# Patient Record
Sex: MALE | Race: WHITE | NOT HISPANIC OR LATINO | Employment: OTHER | URBAN - NONMETROPOLITAN AREA
[De-identification: names, ages, dates, MRNs, and addresses within clinical notes are randomized per-mention and may not be internally consistent; named-entity substitution may affect disease eponyms.]

---

## 2023-01-23 PROBLEM — M54.9 CHRONIC BACK PAIN: Status: ACTIVE | Noted: 2023-01-23

## 2023-01-23 PROBLEM — G89.29 CHRONIC BACK PAIN: Status: ACTIVE | Noted: 2023-01-23

## 2023-01-23 PROBLEM — E78.5 HYPERLIPIDEMIA: Status: ACTIVE | Noted: 2023-01-23

## 2023-01-23 PROBLEM — M47.817 LUMBOSACRAL SPONDYLOSIS: Status: ACTIVE | Noted: 2023-01-23

## 2023-01-23 PROBLEM — M48.062 NEUROGENIC CLAUDICATION DUE TO LUMBAR SPINAL STENOSIS: Status: ACTIVE | Noted: 2023-01-23

## 2023-01-23 PROBLEM — I10 HTN (HYPERTENSION): Status: ACTIVE | Noted: 2023-01-23

## 2023-01-23 PROBLEM — M51.26 PROLAPSED LUMBAR DISC: Status: ACTIVE | Noted: 2023-01-23

## 2023-01-23 PROBLEM — R20.0 NUMBNESS: Status: ACTIVE | Noted: 2023-01-23

## 2023-01-23 PROBLEM — E11.9 DM II (DIABETES MELLITUS, TYPE II), CONTROLLED (MULTI): Status: ACTIVE | Noted: 2023-01-23

## 2023-01-23 RX ORDER — METOPROLOL SUCCINATE 25 MG/1
1 TABLET, EXTENDED RELEASE ORAL DAILY
COMMUNITY
Start: 2022-02-02 | End: 2023-06-28

## 2023-01-23 RX ORDER — ASPIRIN 81 MG/1
1 TABLET ORAL DAILY
COMMUNITY
Start: 2022-04-12

## 2023-01-23 RX ORDER — SIMVASTATIN 40 MG/1
1 TABLET, FILM COATED ORAL NIGHTLY
COMMUNITY
Start: 2022-04-12 | End: 2023-03-14 | Stop reason: ALTCHOICE

## 2023-01-23 RX ORDER — GABAPENTIN 100 MG/1
1 CAPSULE ORAL
COMMUNITY
Start: 2022-08-24 | End: 2023-03-14 | Stop reason: ALTCHOICE

## 2023-01-23 RX ORDER — METFORMIN HYDROCHLORIDE 1000 MG/1
1 TABLET ORAL
COMMUNITY
Start: 2022-04-12 | End: 2023-10-12 | Stop reason: SDUPTHER

## 2023-01-23 RX ORDER — MULTIVITAMIN
1 TABLET ORAL DAILY
COMMUNITY
Start: 2022-04-12

## 2023-03-07 ENCOUNTER — APPOINTMENT (OUTPATIENT)
Dept: PRIMARY CARE | Facility: CLINIC | Age: 83
End: 2023-03-07
Payer: MEDICARE

## 2023-03-14 ENCOUNTER — OFFICE VISIT (OUTPATIENT)
Dept: PRIMARY CARE | Facility: CLINIC | Age: 83
End: 2023-03-14
Payer: MEDICARE

## 2023-03-14 VITALS
SYSTOLIC BLOOD PRESSURE: 165 MMHG | BODY MASS INDEX: 41.58 KG/M2 | DIASTOLIC BLOOD PRESSURE: 86 MMHG | HEIGHT: 71 IN | WEIGHT: 297 LBS | HEART RATE: 94 BPM

## 2023-03-14 DIAGNOSIS — M48.062 NEUROGENIC CLAUDICATION DUE TO LUMBAR SPINAL STENOSIS: ICD-10-CM

## 2023-03-14 DIAGNOSIS — E78.2 MIXED HYPERLIPIDEMIA: ICD-10-CM

## 2023-03-14 DIAGNOSIS — E11.9 CONTROLLED TYPE 2 DIABETES MELLITUS WITHOUT COMPLICATION, WITHOUT LONG-TERM CURRENT USE OF INSULIN (MULTI): ICD-10-CM

## 2023-03-14 DIAGNOSIS — M47.27 OSTEOARTHRITIS OF SPINE WITH RADICULOPATHY, LUMBOSACRAL REGION: ICD-10-CM

## 2023-03-14 DIAGNOSIS — I10 PRIMARY HYPERTENSION: Primary | ICD-10-CM

## 2023-03-14 PROCEDURE — 1159F MED LIST DOCD IN RCRD: CPT | Performed by: INTERNAL MEDICINE

## 2023-03-14 PROCEDURE — 3077F SYST BP >= 140 MM HG: CPT | Performed by: INTERNAL MEDICINE

## 2023-03-14 PROCEDURE — 1036F TOBACCO NON-USER: CPT | Performed by: INTERNAL MEDICINE

## 2023-03-14 PROCEDURE — 99214 OFFICE O/P EST MOD 30 MIN: CPT | Performed by: INTERNAL MEDICINE

## 2023-03-14 PROCEDURE — 1160F RVW MEDS BY RX/DR IN RCRD: CPT | Performed by: INTERNAL MEDICINE

## 2023-03-14 PROCEDURE — 3079F DIAST BP 80-89 MM HG: CPT | Performed by: INTERNAL MEDICINE

## 2023-03-14 RX ORDER — GLIPIZIDE 5 MG/1
1 TABLET, FILM COATED, EXTENDED RELEASE ORAL DAILY
COMMUNITY
Start: 2023-03-09

## 2023-03-14 RX ORDER — INSULIN GLARGINE 100 [IU]/ML
15 INJECTION, SOLUTION SUBCUTANEOUS EVERY MORNING
COMMUNITY
End: 2024-04-16 | Stop reason: ALTCHOICE

## 2023-03-14 RX ORDER — SIMVASTATIN 40 MG/1
40 TABLET, FILM COATED ORAL NIGHTLY
Qty: 90 TABLET | Refills: 1 | Status: SHIPPED | OUTPATIENT
Start: 2023-03-14 | End: 2023-10-24 | Stop reason: SDUPTHER

## 2023-03-14 ASSESSMENT — ENCOUNTER SYMPTOMS
SHORTNESS OF BREATH: 0
DIARRHEA: 0
NAUSEA: 0
ABDOMINAL DISTENTION: 0
BACK PAIN: 1
WHEEZING: 0
WEAKNESS: 0
NUMBNESS: 0
FATIGUE: 0
ACTIVITY CHANGE: 0
APPETITE CHANGE: 0
VOMITING: 0
SINUS PRESSURE: 0
CHILLS: 0
COUGH: 0
SORE THROAT: 0
SINUS PAIN: 0

## 2023-03-14 ASSESSMENT — PATIENT HEALTH QUESTIONNAIRE - PHQ9
1. LITTLE INTEREST OR PLEASURE IN DOING THINGS: NOT AT ALL
2. FEELING DOWN, DEPRESSED OR HOPELESS: NOT AT ALL
SUM OF ALL RESPONSES TO PHQ9 QUESTIONS 1 AND 2: 0

## 2023-03-14 NOTE — ASSESSMENT & PLAN NOTE
- following with pain management   - had lumbar mri   - did not complete pt because then he got bell's palsy

## 2023-03-14 NOTE — ASSESSMENT & PLAN NOTE
- seeing Endocrinology now, will have labs in 3 mo   - continue glipizide 5mg po daily   - continue lantus 20 units   - continue metformin 1000mg po bid

## 2023-03-14 NOTE — PROGRESS NOTES
"Subjective   Patient ID: John Hicks is a 82 y.o. male who presents for Follow-up (3 month/BS readings: 129; 131; 138; 141; 107; 146; 135).  HPI    Patient is here today for 3 mo follow up on DMII   Pt had URI recently but that has resolved, wife is sick now.   Pt reports that his wife has been limited his sweets, overall his blood sugars are much improved.   [place] palsy resolved.     Review of Systems   Constitutional:  Negative for activity change, appetite change, chills and fatigue.   HENT:  Negative for congestion, postnasal drip, sinus pressure, sinus pain and sore throat.    Respiratory:  Negative for cough, shortness of breath and wheezing.    Cardiovascular:  Negative for chest pain and leg swelling.   Gastrointestinal:  Negative for abdominal distention, diarrhea, nausea and vomiting.   Musculoskeletal:  Positive for back pain.   Neurological:  Negative for weakness and numbness.       Objective   /86 (BP Location: Right arm, Patient Position: Sitting, BP Cuff Size: Adult)   Pulse 94   Ht 1.803 m (5' 11\")   Wt 135 kg (297 lb)   BMI 41.42 kg/m²     Physical Exam  Constitutional:       General: He is not in acute distress.     Appearance: Normal appearance.   HENT:      Head: Normocephalic.      Nose: Nose normal.      Mouth/Throat:      Mouth: Mucous membranes are dry.      Pharynx: No oropharyngeal exudate.   Eyes:      General:         Right eye: No discharge.         Left eye: No discharge.      Extraocular Movements: Extraocular movements intact.      Pupils: Pupils are equal, round, and reactive to light.   Cardiovascular:      Rate and Rhythm: Normal rate and regular rhythm.      Heart sounds: No murmur heard.     No gallop.   Pulmonary:      Effort: Pulmonary effort is normal. No respiratory distress.      Breath sounds: Normal breath sounds. No wheezing.   Musculoskeletal:         General: No swelling. Normal range of motion.   Skin:     General: Skin is warm and dry.      Coloration: " Skin is not jaundiced.   Neurological:      General: No focal deficit present.      Mental Status: He is alert and oriented to person, place, and time.      Cranial Nerves: No cranial nerve deficit.   Psychiatric:         Mood and Affect: Mood normal.         Behavior: Behavior normal.         Assessment/Plan   Problem List Items Addressed This Visit          Circulatory    HTN (hypertension) - Primary     Continue metoprolol xl po daily             Musculoskeletal    Neurogenic claudication due to lumbar spinal stenosis     - following with pain management   - had lumbar mri   - did not complete pt because then he got bell's palsy         Lumbosacral spondylosis     - see above            Endocrine/Metabolic    DM II (diabetes mellitus, type II), controlled (CMS/HCC) (Chronic)     - seeing Endocrinology now, will have labs in 3 mo   - continue glipizide 5mg po daily   - continue lantus 20 units   - continue metformin 1000mg po bid            Relevant Medications    simvastatin (Zocor) 40 mg tablet       Other    Hyperlipidemia     - continue simvastatin 40mg po daily             Final diagnoses:   [I10] Primary hypertension   [E11.9] Controlled type 2 diabetes mellitus without complication, without long-term current use of insulin (CMS/HCC)   [M48.062] Neurogenic claudication due to lumbar spinal stenosis   [M47.27] Osteoarthritis of spine with radiculopathy, lumbosacral region   [E78.2] Mixed hyperlipidemia

## 2023-05-16 ENCOUNTER — OFFICE VISIT (OUTPATIENT)
Dept: PRIMARY CARE | Facility: CLINIC | Age: 83
End: 2023-05-16
Payer: MEDICARE

## 2023-05-16 VITALS
BODY MASS INDEX: 41.04 KG/M2 | SYSTOLIC BLOOD PRESSURE: 164 MMHG | HEART RATE: 97 BPM | DIASTOLIC BLOOD PRESSURE: 90 MMHG | HEIGHT: 72 IN | WEIGHT: 303 LBS

## 2023-05-16 DIAGNOSIS — S29.019A THORACIC MYOFASCIAL STRAIN, INITIAL ENCOUNTER: Primary | ICD-10-CM

## 2023-05-16 DIAGNOSIS — M19.049 HAND ARTHRITIS: ICD-10-CM

## 2023-05-16 PROCEDURE — 1036F TOBACCO NON-USER: CPT | Performed by: PHYSICIAN ASSISTANT

## 2023-05-16 PROCEDURE — 99214 OFFICE O/P EST MOD 30 MIN: CPT | Performed by: PHYSICIAN ASSISTANT

## 2023-05-16 PROCEDURE — 1159F MED LIST DOCD IN RCRD: CPT | Performed by: PHYSICIAN ASSISTANT

## 2023-05-16 PROCEDURE — 3080F DIAST BP >= 90 MM HG: CPT | Performed by: PHYSICIAN ASSISTANT

## 2023-05-16 PROCEDURE — 3077F SYST BP >= 140 MM HG: CPT | Performed by: PHYSICIAN ASSISTANT

## 2023-05-16 PROCEDURE — 1160F RVW MEDS BY RX/DR IN RCRD: CPT | Performed by: PHYSICIAN ASSISTANT

## 2023-05-16 RX ORDER — CELECOXIB 100 MG/1
CAPSULE ORAL
Qty: 60 CAPSULE | Refills: 2 | Status: SHIPPED | OUTPATIENT
Start: 2023-05-16

## 2023-05-16 RX ORDER — METHOCARBAMOL 500 MG/1
500 TABLET, FILM COATED ORAL 4 TIMES DAILY PRN
Qty: 40 TABLET | Refills: 0 | Status: SHIPPED | OUTPATIENT
Start: 2023-05-16 | End: 2023-06-26

## 2023-05-16 ASSESSMENT — PATIENT HEALTH QUESTIONNAIRE - PHQ9
2. FEELING DOWN, DEPRESSED OR HOPELESS: NOT AT ALL
1. LITTLE INTEREST OR PLEASURE IN DOING THINGS: NOT AT ALL
SUM OF ALL RESPONSES TO PHQ9 QUESTIONS 1 AND 2: 0

## 2023-05-16 NOTE — PROGRESS NOTES
"Subjective   Patient ID: John Hicks is a 82 y.o. male who presents for Back Pain (Right mid back discomfort that radiates to the front rib area x 3 weeks.  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  HPI  Patient presents for evaluation of thoracic pain and radiculopathy.  Patient states that pain started approximately 3 weeks ago while working outside.  Patient has applied topical formulations and rested only modest improvement.  Pain is located around T6 and there is right-sided radiculopathy.  No trauma or fall.    Patient also reports chronic intermittent left hand arthritic pain.  There is associated reduced range of motion.  No reported attempted conservative management.  Pain is alleviated by rest and massage.    Review of Systems    Constitutional:  See HPI   Musculoskeletal: See HPI  Neurologic:  Alert and oriented X4, No numbness, No tingling.    All other systems are negative     Objective     /90 (BP Location: Left arm, Patient Position: Sitting, BP Cuff Size: Adult)   Pulse 97   Ht 1.816 m (5' 11.5\")   Wt 137 kg (303 lb)   BMI 41.67 kg/m²     Physical Exam    General:  Alert and oriented, No acute distress.    Eye:  Pupils are equal, round and reactive to light, Normal conjunctiva.    HENT:  Normocephalic,   Neck:  Supple    Respiratory: Respirations are non-labored   Musculoskeletal: Right paravertebral muscle tenderness to palpation; no midline tenderness or step-offs  Integumentary:  Warm, Dry, Intact, No pallor, No rash.    Neurologic:  Alert, Oriented, Normal sensory, Cranial Nerves II-XII are grossly intact  Psychiatric:  Cooperative, Appropriate mood & affect.    Assessment/Plan   Thoracic strain with radiculopathy: Celebrex and Robaxin.    Arthritic pain of the left hand: Celebrex 100 mg as needed  Problem List Items Addressed This Visit    None  Visit Diagnoses       Thoracic myofascial strain, initial encounter    -  Primary    Relevant Medications    methocarbamol (Robaxin) 500 mg tablet "    celecoxib (CeleBREX) 100 mg capsule    Hand arthritis        Relevant Medications    celecoxib (CeleBREX) 100 mg capsule            Final diagnoses:   [S29.019A] Thoracic myofascial strain, initial encounter   [M19.049] Hand arthritis

## 2023-06-25 DIAGNOSIS — S29.019A THORACIC MYOFASCIAL STRAIN, INITIAL ENCOUNTER: ICD-10-CM

## 2023-06-26 RX ORDER — METHOCARBAMOL 500 MG/1
TABLET, FILM COATED ORAL
Qty: 40 TABLET | Refills: 0 | Status: SHIPPED | OUTPATIENT
Start: 2023-06-26 | End: 2023-06-28

## 2023-06-27 DIAGNOSIS — I10 ESSENTIAL (PRIMARY) HYPERTENSION: ICD-10-CM

## 2023-06-27 DIAGNOSIS — S29.019A THORACIC MYOFASCIAL STRAIN, INITIAL ENCOUNTER: ICD-10-CM

## 2023-06-28 RX ORDER — METOPROLOL SUCCINATE 25 MG/1
TABLET, EXTENDED RELEASE ORAL
Qty: 90 TABLET | Refills: 3 | Status: SHIPPED | OUTPATIENT
Start: 2023-06-28

## 2023-06-28 RX ORDER — METHOCARBAMOL 500 MG/1
TABLET, FILM COATED ORAL
Qty: 40 TABLET | Refills: 0 | Status: SHIPPED | OUTPATIENT
Start: 2023-06-28 | End: 2023-09-26

## 2023-09-14 ENCOUNTER — OFFICE VISIT (OUTPATIENT)
Dept: PRIMARY CARE | Facility: CLINIC | Age: 83
End: 2023-09-14
Payer: MEDICARE

## 2023-09-14 VITALS
DIASTOLIC BLOOD PRESSURE: 67 MMHG | SYSTOLIC BLOOD PRESSURE: 160 MMHG | HEART RATE: 75 BPM | HEIGHT: 72 IN | WEIGHT: 305 LBS | BODY MASS INDEX: 41.31 KG/M2

## 2023-09-14 DIAGNOSIS — E78.2 MIXED HYPERLIPIDEMIA: ICD-10-CM

## 2023-09-14 DIAGNOSIS — D50.9 IRON DEFICIENCY ANEMIA, UNSPECIFIED IRON DEFICIENCY ANEMIA TYPE: ICD-10-CM

## 2023-09-14 DIAGNOSIS — Z00.00 MEDICARE ANNUAL WELLNESS VISIT, SUBSEQUENT: ICD-10-CM

## 2023-09-14 DIAGNOSIS — Z79.4 CONTROLLED TYPE 2 DIABETES MELLITUS WITHOUT COMPLICATION, WITH LONG-TERM CURRENT USE OF INSULIN (MULTI): Chronic | ICD-10-CM

## 2023-09-14 DIAGNOSIS — M51.26 PROLAPSED LUMBAR DISC: ICD-10-CM

## 2023-09-14 DIAGNOSIS — M47.27 OSTEOARTHRITIS OF SPINE WITH RADICULOPATHY, LUMBOSACRAL REGION: ICD-10-CM

## 2023-09-14 DIAGNOSIS — E11.9 CONTROLLED TYPE 2 DIABETES MELLITUS WITHOUT COMPLICATION, WITH LONG-TERM CURRENT USE OF INSULIN (MULTI): Chronic | ICD-10-CM

## 2023-09-14 DIAGNOSIS — Z23 NEED FOR INFLUENZA VACCINATION: ICD-10-CM

## 2023-09-14 DIAGNOSIS — I10 PRIMARY HYPERTENSION: ICD-10-CM

## 2023-09-14 DIAGNOSIS — M48.062 NEUROGENIC CLAUDICATION DUE TO LUMBAR SPINAL STENOSIS: ICD-10-CM

## 2023-09-14 DIAGNOSIS — Z12.5 SCREENING FOR PROSTATE CANCER: Primary | ICD-10-CM

## 2023-09-14 PROCEDURE — 3078F DIAST BP <80 MM HG: CPT | Performed by: INTERNAL MEDICINE

## 2023-09-14 PROCEDURE — 90662 IIV NO PRSV INCREASED AG IM: CPT | Performed by: INTERNAL MEDICINE

## 2023-09-14 PROCEDURE — 1036F TOBACCO NON-USER: CPT | Performed by: INTERNAL MEDICINE

## 2023-09-14 PROCEDURE — 3077F SYST BP >= 140 MM HG: CPT | Performed by: INTERNAL MEDICINE

## 2023-09-14 PROCEDURE — 1159F MED LIST DOCD IN RCRD: CPT | Performed by: INTERNAL MEDICINE

## 2023-09-14 PROCEDURE — 1160F RVW MEDS BY RX/DR IN RCRD: CPT | Performed by: INTERNAL MEDICINE

## 2023-09-14 PROCEDURE — 99214 OFFICE O/P EST MOD 30 MIN: CPT | Performed by: INTERNAL MEDICINE

## 2023-09-14 PROCEDURE — G0008 ADMIN INFLUENZA VIRUS VAC: HCPCS | Performed by: INTERNAL MEDICINE

## 2023-09-14 PROCEDURE — G0439 PPPS, SUBSEQ VISIT: HCPCS | Performed by: INTERNAL MEDICINE

## 2023-09-14 RX ORDER — BLOOD-GLUCOSE METER
EACH MISCELLANEOUS
COMMUNITY
Start: 2023-08-08 | End: 2024-02-26

## 2023-09-14 NOTE — PROGRESS NOTES
Chief Complaint: Medicare Wellness Exam/Comprehensive Problem Focused Follow Up and Physical Exam    HPI:  PATient is here today for MWV.   Pt reports that he has been doing ok.  Has not been back to see the endocrinologist.   Would like flu shot today.     Active Problem List  Patient Active Problem List   Diagnosis    Chronic back pain    Neurogenic claudication due to lumbar spinal stenosis    DM II (diabetes mellitus, type II), controlled (CMS/[facility])    HTN (hypertension)    Hyperlipidemia    Lumbosacral spondylosis    Numbness    Prolapsed lumbar disc       Comprehensive Medical/Surgical/Social/Family History  Past Medical History:   Diagnosis Date    Other specified health status     No pertinent past medical history     Past Surgical History:   Procedure Laterality Date    OTHER SURGICAL HISTORY  04/12/2022    Gallbladder surgery    OTHER SURGICAL HISTORY  04/12/2022    Back surgery    OTHER SURGICAL HISTORY  04/12/2022    Tonsillectomy     Social History     Tobacco Use    Smoking status: Never    Smokeless tobacco: Never   Substance Use Topics    Alcohol use: Yes     Comment: Occasional    Drug use: Never     Family History   Problem Relation Name Age of Onset    Heart attack Mother      Lung cancer Father          non-small cell carcinoma of lung    Breast cancer Sister      Cancer Other           Allergies and Medications  Patient has no known allergies.  Current Outpatient Medications on File Prior to Visit   Medication Sig Dispense Refill    OneTouch Verio test strips strip Use to test BLOOD SUGAR TWICE DAILY      aspirin 81 mg EC tablet Take 1 tablet (81 mg) by mouth 1 (one) time each day.      celecoxib (CeleBREX) 100 mg capsule Take one pill twice daily for one week then take twice daily as needed thereafter 60 capsule 2    glipiZIDE XL (Glucotrol XL) 5 mg 24 hr tablet Take 1 tablet (5 mg) by mouth once daily.      insulin glargine (Lantus Solostar U-100 Insulin) 100 unit/mL (3 mL) pen Inject 15 Units  under the skin once daily in the morning. Take as directed per insulin instructions.      metFORMIN (Glucophage) 1,000 mg tablet Take 1 tablet (1,000 mg) by mouth. 2 times daily      methocarbamol (Robaxin) 500 mg tablet TAKE 1 TABLET BY MOUTH FOUR TIMES DAILY AS NEEDED FOR MUSCLE SPASMS 40 tablet 0    metoprolol succinate XL (Toprol-XL) 25 mg 24 hr tablet take 1 tablet by mouth once daily 90 tablet 3    multivitamin tablet Take 1 tablet by mouth 1 (one) time each day.      simvastatin (Zocor) 40 mg tablet Take 1 tablet (40 mg) by mouth once daily at bedtime. 90 tablet 1     No current facility-administered medications on file prior to visit.       Medicare Wellness Questionnaire        How have you been on Medicare less than a year ? No  Have you had a Medicare Wellness exam before ? No  Have you had any surgeries in the last year ? No  Have you developed any new diseases in the last year ? No  Have any close family members developed new diseases in the last year ? No  Have you been to a the hospital in the last year ? No  Do you take any pills or supplements other than those prescribed for you ? No  Do you take any opiates for pain such as Tramadol, Percocet or Norco ? No  How do you consider your overall health ?Good  Have you ever used tobacco products ? Yes   Have you smoked more than 100 cigarettes in your life ?  Yes  What form of tobacco have you used ? Cigarettes  How many packs per day ? 1ppd How many years ? <5   Have you quit ? Yes  Do you drink alcohol ?  Yes   What is the most you drink in one day ? 0  How many drinks usually in 1 Week ? 0  Have you ever used illegal drugs at anytime in your life including Marijuana ? No   Which of the following describes your diet ?Well balanced  How many days per week on average do you exercise ? 0 days  Do you have any loss of hearing ? No  Do you have hearing aids ? No  Have you or others noted you have loss of memory ? No  Do you need someone to assist you with  "any of the following ?none   Do you need someone to assist you with any of the following ?none  Have you fallen in the last 6 months ? No  Do you have any of the following in your house ? None   Do you have a living will ? Yes  Do you have a durable power of  for health care decisions ? Yes    Medications and Supplements  prescribed by me and other practitioners or clinical pharmacist (such as prescriptions, OTC's, herbal therapies and supplements) were reviewed and documented in the medical record.    Tobacco/Alcohol/Opioid use, as well as Illicit Drug Use was screened for/reviewed and documented in Social History section and medication list as appropriate  Activities of Daily Living  In your present state of health, do you have any difficulty performing the following activities?:   Preparing food and eating?: No  Bathing yourself: No  Getting dressed: No  Using the toilet:No  Moving around from place to place: No  In the past year have you fallen or had a near fall?:No    Depression Screen  (Note: if answer to either of the following is \"Yes\", then a more complete depression screening is indicated)   Q1: Over the past two weeks, have you felt down, depressed or hopeless?    Q2: Over the past two weeks, have you felt little interest or pleasure in doing things?      Current exercise habits: The patient does not participate in regular exercise at present.   Dietary issues discussed: Yes  Hearing difficulties: No  Safe in current home environment: no  Visual Acuity assessed: no  Cognitive Impairment assessed: no       Advance directives  Advanced Care Planning (including a Living Will, Healthcare POA, as well as specific end of life choices and/or directives), was discussed for approximately 1 minutes with the patient and/or surrogate, voluntarily, and documented in the medical record.     Cardiac Risk Assessment  Cardiovascular risk was discussed and, if needed, lifestyle modifications recommended, including " "nutritional choices, exercise, and elimination of habits contributing to risk. We agreed on a plan to reduce the current cardiovascular risk based on above discussion as needed.  Aspirin use/disuse was discussed after reviewing the updated guidelines below:    Consider low dose Aspirin ( mg) use if the benefit for cardiovascular disease prevention outweighs risk for bleeding complications.   In general, low dose ASA should be considered:  In patients WITHOUT prior MI/stroke/PAD (primary prevention):   a. Age <60: Use if 10-year cardiovascular disease risk >20%, with discussion of risks and benefits with patient  b. Age 60-<70: Use if 10-year cardiovascular disease risk >20% and low bleeding (e.g., gastrointenstinal) risk, with discussion of risks and benefits with patient  c. Age >=70: Do not use    In patients WITH prior MI/stroke/PAD (secondary prevention):   Generally use unless extremely high bleeding (e.g., gastrointenstinal) risk, with discussion of risks and benefits with patient    ROS otherwise negative aside from what was mentioned above in HPI.    Vitals  /67 (BP Location: Left arm, Patient Position: Sitting, BP Cuff Size: Adult)   Pulse 75   Ht 1.816 m (5' 11.5\")   Wt 138 kg (305 lb)   BMI 41.95 kg/m²   Body mass index is 41.95 kg/m².  Physical Exam  Gen: Alert, NAD  HEENT:  PERRLA, EOMI, conjunctiva and sclera normal in appearance.   Neck:  Supple with FROM; No masses/nodes palpable; Thyroid nontender and without nodules; No ANJANA  Respiratory:  Lungs CTAB  Cardiovascular:  Heart RRR. No M/R/G. Peripheral pulses equal bilaterally  Abdomen:  Soft, nontender, BS present throughout; No R/G/R; No HSM or masses palpated  Extremities:  FROM all extremities; Muscle strength grossly normal with good tone  Neuro:  CN II-XII intact; Reflexes 2+/2+; Gross motor and sensory intact  Skin:  No suspicious lesions present      Assessment and Plan:  Problem List Items Addressed This Visit       Neurogenic " claudication due to lumbar spinal stenosis    DM II (diabetes mellitus, type II), controlled (CMS/HCC) (Chronic)    Relevant Orders    Hemoglobin A1C    Comprehensive Metabolic Panel    Lipid Panel    HTN (hypertension)    Hyperlipidemia    Lumbosacral spondylosis    Prolapsed lumbar disc     Other Visit Diagnoses       Screening for prostate cancer    -  Primary    Relevant Orders    Prostate Spec.Ag,Screen          DMII   - seeing Endocrinology now, will repeat A1c, cmp, lipid   - continue glipizide 5mg po daily   - continue lantus 20 units   - continue metformin 1000mg po bid      2. Neurogenic claudication due to lumbar spinal stenosis   - following with pain management   - did not complete pT because he got bells palsy     3. HLD   - continue simvastatin 40mg po daily  - check lipid panel     4. HTN   - continue metoprolol xl po daily     During the course of the visit the patient was educated and counseled about age appropriate screening and preventive services. Completed preventive screenings were documented in the chart and orders were placed for outstanding screenings/procedures as documented in the Assessment and Plan.      Patient Instructions (the written plan) was given to the patient at check out.      Agata Bauman DO

## 2023-09-26 DIAGNOSIS — S29.019A THORACIC MYOFASCIAL STRAIN, INITIAL ENCOUNTER: ICD-10-CM

## 2023-09-26 RX ORDER — METHOCARBAMOL 500 MG/1
TABLET, FILM COATED ORAL
Qty: 40 TABLET | Refills: 0 | Status: SHIPPED | OUTPATIENT
Start: 2023-09-26 | End: 2023-12-27

## 2023-10-12 DIAGNOSIS — E11.9 CONTROLLED TYPE 2 DIABETES MELLITUS WITHOUT COMPLICATION, WITHOUT LONG-TERM CURRENT USE OF INSULIN (MULTI): Primary | Chronic | ICD-10-CM

## 2023-10-12 RX ORDER — METFORMIN HYDROCHLORIDE 1000 MG/1
1000 TABLET ORAL
Qty: 180 TABLET | Refills: 3 | Status: SHIPPED | OUTPATIENT
Start: 2023-10-12

## 2023-10-24 DIAGNOSIS — E11.9 CONTROLLED TYPE 2 DIABETES MELLITUS WITHOUT COMPLICATION, WITHOUT LONG-TERM CURRENT USE OF INSULIN (MULTI): ICD-10-CM

## 2023-10-24 RX ORDER — SIMVASTATIN 40 MG/1
40 TABLET, FILM COATED ORAL NIGHTLY
Qty: 90 TABLET | Refills: 3 | Status: SHIPPED | OUTPATIENT
Start: 2023-10-24

## 2023-12-27 DIAGNOSIS — S29.019A THORACIC MYOFASCIAL STRAIN, INITIAL ENCOUNTER: ICD-10-CM

## 2023-12-27 RX ORDER — METHOCARBAMOL 500 MG/1
TABLET, FILM COATED ORAL
Qty: 40 TABLET | Refills: 0 | Status: SHIPPED | OUTPATIENT
Start: 2023-12-27 | End: 2024-03-27

## 2024-01-04 ENCOUNTER — OFFICE VISIT (OUTPATIENT)
Dept: PRIMARY CARE | Facility: CLINIC | Age: 84
End: 2024-01-04
Payer: MEDICARE

## 2024-01-04 VITALS
BODY MASS INDEX: 44.1 KG/M2 | DIASTOLIC BLOOD PRESSURE: 75 MMHG | SYSTOLIC BLOOD PRESSURE: 161 MMHG | TEMPERATURE: 97.1 F | HEART RATE: 89 BPM | HEIGHT: 71 IN | WEIGHT: 315 LBS

## 2024-01-04 DIAGNOSIS — M48.062 NEUROGENIC CLAUDICATION DUE TO LUMBAR SPINAL STENOSIS: ICD-10-CM

## 2024-01-04 DIAGNOSIS — N40.1 BENIGN PROSTATIC HYPERPLASIA WITH URINARY FREQUENCY: Primary | ICD-10-CM

## 2024-01-04 DIAGNOSIS — R35.0 BENIGN PROSTATIC HYPERPLASIA WITH URINARY FREQUENCY: Primary | ICD-10-CM

## 2024-01-04 DIAGNOSIS — S39.012A ACUTE MYOFASCIAL STRAIN OF LUMBAR REGION, INITIAL ENCOUNTER: ICD-10-CM

## 2024-01-04 PROCEDURE — 3077F SYST BP >= 140 MM HG: CPT | Performed by: PHYSICIAN ASSISTANT

## 2024-01-04 PROCEDURE — 99214 OFFICE O/P EST MOD 30 MIN: CPT | Performed by: PHYSICIAN ASSISTANT

## 2024-01-04 PROCEDURE — 1159F MED LIST DOCD IN RCRD: CPT | Performed by: PHYSICIAN ASSISTANT

## 2024-01-04 PROCEDURE — 1036F TOBACCO NON-USER: CPT | Performed by: PHYSICIAN ASSISTANT

## 2024-01-04 PROCEDURE — 3078F DIAST BP <80 MM HG: CPT | Performed by: PHYSICIAN ASSISTANT

## 2024-01-04 RX ORDER — TAMSULOSIN HYDROCHLORIDE 0.4 MG/1
0.4 CAPSULE ORAL DAILY
Qty: 30 CAPSULE | Refills: 11 | Status: SHIPPED | OUTPATIENT
Start: 2024-01-04 | End: 2025-01-03

## 2024-01-04 RX ORDER — TRAMADOL HYDROCHLORIDE 50 MG/1
50 TABLET ORAL EVERY 6 HOURS PRN
Qty: 20 TABLET | Refills: 0 | Status: SHIPPED | OUTPATIENT
Start: 2024-01-04 | End: 2024-01-09

## 2024-01-04 ASSESSMENT — PATIENT HEALTH QUESTIONNAIRE - PHQ9
2. FEELING DOWN, DEPRESSED OR HOPELESS: NOT AT ALL
SUM OF ALL RESPONSES TO PHQ9 QUESTIONS 1 AND 2: 0
1. LITTLE INTEREST OR PLEASURE IN DOING THINGS: NOT AT ALL

## 2024-01-04 NOTE — PROGRESS NOTES
"Subjective   Patient ID: John Hicks is a 83 y.o. male who presents for Back Pain (Lower back pain x yesterday, patient states he stood up and turned to the right and felt discomfort.).  HPI  Patient presents to discuss low back pain.  Patient has known history of fairly severe wear-and-tear on the low back including lumbar spinal stenosis, lumbar spondylolysis, and herniated disks.  Patient was previously in pain management for this.  Patient states that yesterday there was an attempted twist motion while seated and significant pain in the lumbar spine thereafter.  Patient had taken Robaxin with little relief.  No trauma, fall, direct blow, or saddle paresthesias reported.    Patient also wants to discuss increased urination.  Patient reports progressively worsening increased urinary frequency over the recent past.  Patient had PSA done in September that was normal.  Patient has not seen a urologist.  Patient's glucose is reportedly in the 160-180 range.    Review of Systems    Constitutional:  See HPI   Genitourinary: See HPI  Musculoskeletal: See HPI  Neurologic:  Alert and oriented X4, No numbness, No tingling.    All other systems are negative     Objective     /75   Pulse 89   Temp 36.2 °C (97.1 °F) (Temporal)   Ht 1.803 m (5' 11\")   Wt 144 kg (317 lb 1.6 oz)   BMI 44.23 kg/m²     Physical Exam    General:  Alert and oriented, No acute distress.    Eye:  Pupils are equal, round and reactive to light, Normal conjunctiva.    HENT:  Normocephalic,   Neck:  Supple    Respiratory: Respirations are non-labored   Musculoskeletal: Normal ROM and strength  Integumentary:  Warm, Dry, Intact, No pallor, No rash.    Neurologic:  Alert, Oriented, Normal sensory, Cranial Nerves II-XII are grossly intact  Psychiatric:  Cooperative, Appropriate mood & affect.    Assessment/Plan   Lumbar strain in the setting of lumbar spinal stenosis, lumbar disc herniations, and lumbar spondylosis: Tramadol written.  Recommend " rest and ice otherwise.  May continue Robaxin as needed    BPH: Patient's symptoms are consistent with this.  Patient's PSA is normal and diabetes is well enough controlled not to cause urinary frequency increase.  Prescription for Flomax and referral to urology.  Problem List Items Addressed This Visit       Neurogenic claudication due to lumbar spinal stenosis     Other Visit Diagnoses       Benign prostatic hyperplasia with urinary frequency    -  Primary    Relevant Medications    tamsulosin (Flomax) 0.4 mg 24 hr capsule    Other Relevant Orders    Referral to Urology    Acute myofascial strain of lumbar region, initial encounter        Relevant Medications    traMADol (Ultram) 50 mg tablet            Final diagnoses:   [N40.1, R35.0] Benign prostatic hyperplasia with urinary frequency   [M48.062] Neurogenic claudication due to lumbar spinal stenosis   [S39.012A] Acute myofascial strain of lumbar region, initial encounter

## 2024-01-25 ENCOUNTER — OFFICE VISIT (OUTPATIENT)
Dept: PRIMARY CARE | Facility: CLINIC | Age: 84
End: 2024-01-25
Payer: MEDICARE

## 2024-01-25 VITALS
TEMPERATURE: 97.1 F | WEIGHT: 313.8 LBS | BODY MASS INDEX: 43.93 KG/M2 | SYSTOLIC BLOOD PRESSURE: 134 MMHG | DIASTOLIC BLOOD PRESSURE: 75 MMHG | HEIGHT: 71 IN | HEART RATE: 75 BPM

## 2024-01-25 DIAGNOSIS — M54.42 CHRONIC BILATERAL LOW BACK PAIN WITH BILATERAL SCIATICA: ICD-10-CM

## 2024-01-25 DIAGNOSIS — I10 PRIMARY HYPERTENSION: ICD-10-CM

## 2024-01-25 DIAGNOSIS — M54.41 CHRONIC BILATERAL LOW BACK PAIN WITH BILATERAL SCIATICA: ICD-10-CM

## 2024-01-25 DIAGNOSIS — H50.011 ESOTROPIA OF RIGHT EYE: Primary | ICD-10-CM

## 2024-01-25 DIAGNOSIS — G89.29 CHRONIC BILATERAL LOW BACK PAIN WITH BILATERAL SCIATICA: ICD-10-CM

## 2024-01-25 PROCEDURE — 3078F DIAST BP <80 MM HG: CPT | Performed by: PHYSICIAN ASSISTANT

## 2024-01-25 PROCEDURE — 1159F MED LIST DOCD IN RCRD: CPT | Performed by: PHYSICIAN ASSISTANT

## 2024-01-25 PROCEDURE — 1036F TOBACCO NON-USER: CPT | Performed by: PHYSICIAN ASSISTANT

## 2024-01-25 PROCEDURE — 99213 OFFICE O/P EST LOW 20 MIN: CPT | Performed by: PHYSICIAN ASSISTANT

## 2024-01-25 PROCEDURE — 3075F SYST BP GE 130 - 139MM HG: CPT | Performed by: PHYSICIAN ASSISTANT

## 2024-01-25 NOTE — PROGRESS NOTES
"Subjective   Patient ID: John Hicks is a 83 y.o. male who presents for Hypertension (Patient here today for hypertension, has concerns of increased BP's, advised by eye doctor to follow with PCP./Patient scheduled for CT scan next week, ordered by his eye doctor in [place].).  HPI  Patient presents for possible elevated blood pressure readings.  Patient is currently being worked up by ophthalmology for new onset esotropia of the right eye.  At the visit, patient's blood pressure was elevated.  Patient has known history of hypertension currently managed with metoprolol.  Intake blood pressure today was okay    Review of Systems    Constitutional:  See HPI   Eye: See HPI  Neurologic:  Alert and oriented X4, No numbness, No tingling.    All other systems are negative     Objective     /75   Pulse 75   Temp 36.2 °C (97.1 °F) (Temporal)   Ht 1.803 m (5' 11\")   Wt 142 kg (313 lb 12.8 oz)   BMI 43.77 kg/m²     Physical Exam    General:  Alert and oriented, No acute distress.    Eye: Inward deviation of the right eye noted  HENT:  Normocephalic,   Neck:  Supple    Respiratory: Respirations are non-labored   Musculoskeletal: Normal ROM and strength  Integumentary:  Warm, Dry, Intact, No pallor, No rash.    Neurologic:  Alert, Oriented, Normal sensory, Cranial Nerves II-XII are grossly intact  Psychiatric:  Cooperative, Appropriate mood & affect.    Assessment/Plan   Hypertension: Prescription for blood pressure monitor.  Take twice daily for 1 week and contact primary with results.    Esotropia of the right eye: Continue with ophthalmology as scheduled.  Problem List Items Addressed This Visit       Chronic back pain    HTN (hypertension)     Other Visit Diagnoses       Esotropia of right eye    -  Primary            Final diagnoses:   [H50.011] Esotropia of right eye   [M54.42, M54.41, G89.29] Chronic bilateral low back pain with bilateral sciatica   [I10] Primary hypertension      "

## 2024-01-30 ENCOUNTER — HOSPITAL ENCOUNTER (OUTPATIENT)
Age: 84
Discharge: HOME | End: 2024-01-30
Payer: MEDICARE

## 2024-01-30 DIAGNOSIS — H49.21: Primary | ICD-10-CM

## 2024-01-30 LAB
CREATININE FINGERSTICK: 1.3 MG/DL (ref 0.7–1.3)
EGFR FINGERSTICK: 57 ML/MIN (ref 60–?)

## 2024-01-30 PROCEDURE — 70470 CT HEAD/BRAIN W/O & W/DYE: CPT

## 2024-01-30 NOTE — CT_ITS
REPORT-ID:[id number] 
 
INDICATION: SIXTH NERVE PALSY RIGHT EYE 
 
EXAMINATION: CT BRAIN WITH AND WITHOUT CONTRAST - CT Head or Brain WO/W 
Contrast Injection 
 
TECHNIQUE: 
Multiple axial images were obtained of the brain with and without IV 
contrast. A radiation dose optimization technique was used for this scan. 
IV Contrast dosage and agent: 
 
RADIATION DOSAGE (If Supplied By Facility):  CTDIvol = ( 44.99 ) mGy, DLP = 
( 1715.95 ) mGycm 
 
COMPARISON: 
____________________________________________ 
 
FINDINGS: 
 
BRAIN PARENCHYMA: 
No intra- or extra-axial hemorrhage.  No evidence of acute infarct.  No 
intracranial mass or mass effect.  There is preservation of the gray/white 
matter interface.  Posterior fossa structures are unremarkable.  No 
abnormal contrast enhancement. Questionable 2 mm slightly hyperdense 
nodular density may be present in the region of the foramen of Gonzalez. A 
small colloid cyst cannot be excluded. 
 
CSF SPACES: Appropriate for age.  No hydrocephalus.  Basal cisterns are 
patent. 
 
CALVARIUM, SKULL BASE, PARANASAL SINUSES AND MASTOID AIR CELLS: 
Clear.  No discrete lytic or blastic abnormalities. 
 
ORBITS: Both globes, extraocular muscles, optic nerves and retrobulbar fat 
appear unremarkable. 
 
ORDER #: [id number] CT/Brain/Head W/WO Contrast  
IMPRESSION:  
 
  
Questionable 2 mm slightly hyperdense nodular density may be present in the  
region of the foramen of Gonzalez. A small colloid cyst cannot be excluded.  
Correlate with MRI if needed.  
 
  
Electronically Signed:  
Aman Barros DO  
2024/01/30 at 17:03 EST  
[place] Location ID and State: [address] / [place]  
Tel [phone], Service support  [phone], Fax [phone]

## 2024-02-07 ENCOUNTER — OFFICE VISIT (OUTPATIENT)
Dept: UROLOGY | Facility: CLINIC | Age: 84
End: 2024-02-07
Payer: MEDICARE

## 2024-02-07 DIAGNOSIS — R35.0 URINARY FREQUENCY: ICD-10-CM

## 2024-02-07 DIAGNOSIS — R35.1 NOCTURIA: ICD-10-CM

## 2024-02-07 DIAGNOSIS — N40.1 BENIGN PROSTATIC HYPERPLASIA WITH URINARY FREQUENCY: ICD-10-CM

## 2024-02-07 DIAGNOSIS — R35.0 BENIGN PROSTATIC HYPERPLASIA WITH URINARY FREQUENCY: ICD-10-CM

## 2024-02-07 DIAGNOSIS — N43.3 HYDROCELE, UNSPECIFIED HYDROCELE TYPE: ICD-10-CM

## 2024-02-07 DIAGNOSIS — N50.89 SWELLING OF SCROTUM: ICD-10-CM

## 2024-02-07 PROCEDURE — 1036F TOBACCO NON-USER: CPT | Performed by: UROLOGY

## 2024-02-07 PROCEDURE — 99204 OFFICE O/P NEW MOD 45 MIN: CPT | Performed by: UROLOGY

## 2024-02-07 PROCEDURE — 1159F MED LIST DOCD IN RCRD: CPT | Performed by: UROLOGY

## 2024-02-07 ASSESSMENT — ENCOUNTER SYMPTOMS
ALLERGIC/IMMUNOLOGIC NEGATIVE: 1
DIFFICULTY URINATING: 0
CHILLS: 0
ENDOCRINE NEGATIVE: 1
PSYCHIATRIC NEGATIVE: 1
SHORTNESS OF BREATH: 0
COUGH: 0
NAUSEA: 0
EYES NEGATIVE: 1
FEVER: 0

## 2024-02-07 NOTE — PROGRESS NOTES
Subjective   Patient ID: John Hicks is a 83 y.o. male.    HPI Patient is here to establish as a new patient for urinary frequency. He was started on Flomax, but has not helped sx. No hematuria, No dysuria.. Chronic BPH with LUTs, sx are chronic and worsening. He has a Hx of Back surgery and LUTS have worsened along with his back pain. PSA 0.53 (9/23) Nocturia x 1, depending on fluid intake. Cr was 1.47 9/23. ED is not an Issue    Review of Systems   Constitutional:  Negative for chills and fever.   HENT: Negative.     Eyes: Negative.    Respiratory:  Negative for cough and shortness of breath.    Cardiovascular:  Negative for chest pain and leg swelling.   Gastrointestinal:  Negative for nausea.   Endocrine: Negative.    Genitourinary:  Negative for difficulty urinating.        Negative except for documented in HPI   Allergic/Immunologic: Negative.    Neurological:         Alert & oriented X 3   Hematological:         Denies blood thinners   Psychiatric/Behavioral: Negative.         Objective   Physical Exam  Vitals and nursing note reviewed.   Constitutional:       General: He is not in acute distress.     Appearance: Normal appearance.   Pulmonary:      Effort: Pulmonary effort is normal.   Abdominal:      Tenderness: There is no abdominal tenderness.   Genitourinary:     Comments: Kidneys non palpable bilaterally  Bladder non palpable or tender  Scrotum no mass, BILATERAL LARGE hydrocele  Epididymis- No spermatocele. Non Tender.  Testicles: No mass  Urethra: No discharge  Penis within normal limits... No lesions. No phimosis  Prostate - symmetric, no nodules. Benign  Seminal Vesicals: No mass.  Sphincter tone: normal  Neurological:      Mental Status: He is alert.         Assessment/Plan   Diagnoses and all orders for this visit:  Benign prostatic hyperplasia with urinary frequency  -     Referral to Urology  Urinary frequency  Nocturia    Scrotal U/S ordered for Hydroceles  All available PSA values reviewed,  Options discussed. Questions answered.   Diet changes for prostate health discussed and educational information given. Pros/Cons of prostate health supplements discussed.   Treatment options for LUTS reviewed  discontinue Flomax  Gemtesa Rx given  Discussed timed voiding. Discussed fluid and caffeine intake  Treatment options for ED reviewed.  Lifestyle change to help prevent UTIs discussed. Encouraged fluid intake.    F/U  Med review with scrotal U/S

## 2024-02-08 ENCOUNTER — HOSPITAL ENCOUNTER (OUTPATIENT)
Dept: RADIOLOGY | Facility: HOSPITAL | Age: 84
Discharge: HOME | End: 2024-02-08
Payer: MEDICARE

## 2024-02-08 DIAGNOSIS — N50.89 SWELLING OF SCROTUM: ICD-10-CM

## 2024-02-08 DIAGNOSIS — N43.3 HYDROCELE, UNSPECIFIED HYDROCELE TYPE: ICD-10-CM

## 2024-02-08 PROCEDURE — 93975 VASCULAR STUDY: CPT

## 2024-02-08 PROCEDURE — 76870 US EXAM SCROTUM: CPT | Performed by: STUDENT IN AN ORGANIZED HEALTH CARE EDUCATION/TRAINING PROGRAM

## 2024-02-08 PROCEDURE — 93975 VASCULAR STUDY: CPT | Performed by: STUDENT IN AN ORGANIZED HEALTH CARE EDUCATION/TRAINING PROGRAM

## 2024-02-23 DIAGNOSIS — E11.9 TYPE 2 DIABETES MELLITUS WITHOUT COMPLICATIONS (MULTI): ICD-10-CM

## 2024-02-26 DIAGNOSIS — E11.9 TYPE 2 DIABETES MELLITUS WITHOUT COMPLICATIONS (MULTI): ICD-10-CM

## 2024-02-26 RX ORDER — BLOOD-GLUCOSE METER
EACH MISCELLANEOUS
Qty: 100 STRIP | Refills: 11 | Status: SHIPPED | OUTPATIENT
Start: 2024-02-26 | End: 2024-02-26

## 2024-02-26 RX ORDER — BLOOD-GLUCOSE METER
EACH MISCELLANEOUS
Qty: 100 STRIP | Refills: 11 | Status: SHIPPED | OUTPATIENT
Start: 2024-02-26

## 2024-02-26 ASSESSMENT — ENCOUNTER SYMPTOMS
PSYCHIATRIC NEGATIVE: 1
DIFFICULTY URINATING: 0
CHILLS: 0
ALLERGIC/IMMUNOLOGIC NEGATIVE: 1
SHORTNESS OF BREATH: 0
FEVER: 0
ENDOCRINE NEGATIVE: 1
COUGH: 0
NAUSEA: 0
EYES NEGATIVE: 1

## 2024-02-26 NOTE — PROGRESS NOTES
Subjective   Patient ID: John Hicks is a 83 y.o. male.  Virtual or Telephone Consent    A telephone visit (audio only) between the patient (at the originating site) and the provider (at the distant site) was utilized to provide this telehealth service.   Verbal consent was requested and obtained from John Hicks on this date, 02/28/24 for a telehealth visit.       HPI  Patient is here for scrotal US results and Med check. He was given Gemtesa and states this may of helped some. . He has failed Flomax.  Hx of hydrocele. Recent Scrotal us showed large bilateral hydroceles. Recent PSA was 0.53 on 9/23. ED is not an issue.       Review of Systems   Constitutional:  Negative for chills and fever.   HENT: Negative.     Eyes: Negative.    Respiratory:  Negative for cough and shortness of breath.    Cardiovascular:  Negative for chest pain and leg swelling.   Gastrointestinal:  Negative for nausea.   Endocrine: Negative.    Genitourinary:  Negative for difficulty urinating.        Negative except for documented in HPI   Allergic/Immunologic: Negative.    Neurological:         Alert & oriented X 3   Hematological:         Denies blood thinners   Psychiatric/Behavioral: Negative.         Objective   Physical Exam  No PE done given the virtual nature of visit.   Assessment/Plan   Diagnoses and all orders for this visit:  Benign prostatic hyperplasia with urinary frequency  Hydrocele, unspecified hydrocele type  Urinary frequency  Nocturia    U/S reviewed  Discussed options for Hydroceles  All available PSA values reviewed, Options discussed. Questions answered.   Diet changes for prostate health discussed and educational information given. Pros/Cons of prostate health supplements discussed.   Treatment options for LUTS reviewed  Patient to call if he wants refills on Gemtesa  Discussed timed voiding. Discussed fluid and caffeine intake  Treatment options for ED reviewed.  Lifestyle change to help prevent UTIs discussed.  Encouraged fluid intake.    F/U 9/24 with PSA

## 2024-02-27 DIAGNOSIS — E11.9 CONTROLLED TYPE 2 DIABETES MELLITUS WITHOUT COMPLICATION, WITHOUT LONG-TERM CURRENT USE OF INSULIN (MULTI): Primary | Chronic | ICD-10-CM

## 2024-02-27 RX ORDER — DEXTROSE 4 G
TABLET,CHEWABLE ORAL
Qty: 1 EACH | Refills: 0 | Status: SHIPPED | OUTPATIENT
Start: 2024-02-27

## 2024-02-27 RX ORDER — LANCETS
EACH MISCELLANEOUS
Qty: 50 EACH | Refills: 11 | Status: SHIPPED | OUTPATIENT
Start: 2024-02-27

## 2024-02-28 ENCOUNTER — TELEMEDICINE (OUTPATIENT)
Dept: UROLOGY | Facility: CLINIC | Age: 84
End: 2024-02-28
Payer: MEDICARE

## 2024-02-28 DIAGNOSIS — N40.1 BENIGN PROSTATIC HYPERPLASIA WITH URINARY FREQUENCY: ICD-10-CM

## 2024-02-28 DIAGNOSIS — R35.0 URINARY FREQUENCY: ICD-10-CM

## 2024-02-28 DIAGNOSIS — R35.0 BENIGN PROSTATIC HYPERPLASIA WITH URINARY FREQUENCY: ICD-10-CM

## 2024-02-28 DIAGNOSIS — R35.1 NOCTURIA: ICD-10-CM

## 2024-02-28 DIAGNOSIS — N43.3 HYDROCELE, UNSPECIFIED HYDROCELE TYPE: ICD-10-CM

## 2024-02-28 PROCEDURE — 1159F MED LIST DOCD IN RCRD: CPT | Performed by: UROLOGY

## 2024-02-28 PROCEDURE — 99442 PR PHYS/QHP TELEPHONE EVALUATION 11-20 MIN: CPT | Performed by: UROLOGY

## 2024-02-28 PROCEDURE — 1036F TOBACCO NON-USER: CPT | Performed by: UROLOGY

## 2024-03-14 ENCOUNTER — APPOINTMENT (OUTPATIENT)
Dept: PRIMARY CARE | Facility: CLINIC | Age: 84
End: 2024-03-14
Payer: MEDICARE

## 2024-03-20 ENCOUNTER — APPOINTMENT (OUTPATIENT)
Dept: PRIMARY CARE | Facility: CLINIC | Age: 84
End: 2024-03-20
Payer: MEDICARE

## 2024-03-27 DIAGNOSIS — S29.019A THORACIC MYOFASCIAL STRAIN, INITIAL ENCOUNTER: ICD-10-CM

## 2024-03-27 RX ORDER — METHOCARBAMOL 500 MG/1
TABLET, FILM COATED ORAL
Qty: 40 TABLET | Refills: 0 | Status: SHIPPED | OUTPATIENT
Start: 2024-03-27

## 2024-04-16 ENCOUNTER — OFFICE VISIT (OUTPATIENT)
Dept: PRIMARY CARE | Facility: CLINIC | Age: 84
End: 2024-04-16
Payer: MEDICARE

## 2024-04-16 VITALS
HEIGHT: 71 IN | HEART RATE: 82 BPM | DIASTOLIC BLOOD PRESSURE: 81 MMHG | WEIGHT: 315 LBS | BODY MASS INDEX: 44.1 KG/M2 | SYSTOLIC BLOOD PRESSURE: 143 MMHG

## 2024-04-16 DIAGNOSIS — M47.27 OSTEOARTHRITIS OF SPINE WITH RADICULOPATHY, LUMBOSACRAL REGION: ICD-10-CM

## 2024-04-16 DIAGNOSIS — N40.1 BENIGN PROSTATIC HYPERPLASIA WITH URINARY FREQUENCY: ICD-10-CM

## 2024-04-16 DIAGNOSIS — E11.9 CONTROLLED TYPE 2 DIABETES MELLITUS WITHOUT COMPLICATION, WITHOUT LONG-TERM CURRENT USE OF INSULIN (MULTI): Chronic | ICD-10-CM

## 2024-04-16 DIAGNOSIS — R35.0 BENIGN PROSTATIC HYPERPLASIA WITH URINARY FREQUENCY: ICD-10-CM

## 2024-04-16 DIAGNOSIS — I10 PRIMARY HYPERTENSION: Primary | ICD-10-CM

## 2024-04-16 DIAGNOSIS — M48.062 NEUROGENIC CLAUDICATION DUE TO LUMBAR SPINAL STENOSIS: ICD-10-CM

## 2024-04-16 DIAGNOSIS — E78.2 MIXED HYPERLIPIDEMIA: ICD-10-CM

## 2024-04-16 PROCEDURE — 99214 OFFICE O/P EST MOD 30 MIN: CPT | Performed by: INTERNAL MEDICINE

## 2024-04-16 PROCEDURE — 1036F TOBACCO NON-USER: CPT | Performed by: INTERNAL MEDICINE

## 2024-04-16 PROCEDURE — 1160F RVW MEDS BY RX/DR IN RCRD: CPT | Performed by: INTERNAL MEDICINE

## 2024-04-16 PROCEDURE — 3077F SYST BP >= 140 MM HG: CPT | Performed by: INTERNAL MEDICINE

## 2024-04-16 PROCEDURE — 3079F DIAST BP 80-89 MM HG: CPT | Performed by: INTERNAL MEDICINE

## 2024-04-16 PROCEDURE — 1159F MED LIST DOCD IN RCRD: CPT | Performed by: INTERNAL MEDICINE

## 2024-04-16 RX ORDER — GABAPENTIN 300 MG/1
300 CAPSULE ORAL 3 TIMES DAILY
COMMUNITY

## 2024-04-16 RX ORDER — INSULIN DEGLUDEC 100 U/ML
15 INJECTION, SOLUTION SUBCUTANEOUS EVERY MORNING
COMMUNITY
Start: 2024-03-19

## 2024-04-16 RX ORDER — LANCETS 33 GAUGE
EACH MISCELLANEOUS
COMMUNITY
Start: 2024-03-14

## 2024-04-16 ASSESSMENT — ENCOUNTER SYMPTOMS
WHEEZING: 0
WEAKNESS: 0
DIARRHEA: 0
NAUSEA: 0
SHORTNESS OF BREATH: 0
NUMBNESS: 0
APPETITE CHANGE: 0
ABDOMINAL DISTENTION: 0
BACK PAIN: 0
SINUS PRESSURE: 0
VOMITING: 0
ACTIVITY CHANGE: 0
COUGH: 0
CHILLS: 0
SINUS PAIN: 0
PAIN: 1
SORE THROAT: 0
FATIGUE: 0
ARTHRALGIAS: 1

## 2024-04-16 NOTE — PROGRESS NOTES
"Subjective   Patient ID: John Hicks is a 83 y.o. male who presents for Follow-up (6 month) and Pain (Pt reports body pain since he was bent over cutting his toe nails ).  Pain  Pertinent negatives include no chest pain, diarrhea, fatigue, nausea, shortness of breath, vomiting, weakness or wheezing.     Patient is here today for 6 mo follow up     Review of Systems   Constitutional:  Negative for activity change, appetite change, chills and fatigue.   HENT:  Negative for congestion, postnasal drip, sinus pressure, sinus pain and sore throat.    Respiratory:  Negative for cough, shortness of breath and wheezing.    Cardiovascular:  Negative for chest pain and leg swelling.   Gastrointestinal:  Negative for abdominal distention, diarrhea, nausea and vomiting.   Musculoskeletal:  Positive for arthralgias. Negative for back pain.   Neurological:  Negative for weakness and numbness.       Objective   /81   Pulse 82   Ht 1.803 m (5' 11\")   Wt 143 kg (316 lb)   BMI 44.07 kg/m²     Physical Exam  Constitutional:       General: He is not in acute distress.     Appearance: Normal appearance.   HENT:      Head: Normocephalic.      Nose: Nose normal.      Mouth/Throat:      Pharynx: No oropharyngeal exudate.   Eyes:      General:         Right eye: No discharge.         Left eye: No discharge.      Extraocular Movements: Extraocular movements intact.      Pupils: Pupils are equal, round, and reactive to light.   Cardiovascular:      Rate and Rhythm: Normal rate and regular rhythm.      Heart sounds: No murmur heard.     No gallop.   Pulmonary:      Effort: Pulmonary effort is normal. No respiratory distress.      Breath sounds: Normal breath sounds. No wheezing.   Musculoskeletal:         General: No swelling. Normal range of motion.   Skin:     General: Skin is warm and dry.      Coloration: Skin is not jaundiced.   Neurological:      General: No focal deficit present.      Mental Status: He is alert and oriented to " person, place, and time.      Cranial Nerves: No cranial nerve deficit.   Psychiatric:         Mood and Affect: Mood normal.         Behavior: Behavior normal.         Immunizations   Flu shot received   COVID received   PNA received 13   Shingles recommended   RSV recommended     Colonoscopy   PSA ordered by chandrika   Assessment/Plan   Problem List Items Addressed This Visit       Neurogenic claudication due to lumbar spinal stenosis    DM II (diabetes mellitus, type II), controlled (Multi) (Chronic)    HTN (hypertension) - Primary    Hyperlipidemia    Lumbosacral spondylosis    Benign prostatic hyperplasia with urinary frequency      DMII   - seeing Endocrinology  - A1c 6.6% in 1/24 in Endos office   - continue glipizide 5mg po daily   - continue lantus 15 units   - continue metformin 1000mg po bid     Addendum 8/29/2024 regarding diabetic supplies.  Pt needs to test 3-4 x a day or more as he uses insulin and can have hyperglycemic or hypoglycemic episodes.   Some days he may need to test more than 3-4 x a day depending on how he is feeling or if he has symptoms of being hyper or hypoglycemic.   Blood sugar readings are used to titrate medications including insulin up or down depending on needs.   He currently sees endocrinology and last A1c I have was 6.8% in 4/24 at his endocrinology office.     2. 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 HLD   - continue simvastatin 40mg po daily     4. HTN   - continue metoprolol xl po daily     Final diagnoses:   [I10] Primary hypertension   [E78.2] Mixed hyperlipidemia   [E11.9] Controlled type 2 diabetes mellitus without complication, without long-term current use of insulin (Multi)   [N40.1, R35.0] Benign prostatic hyperplasia with urinary frequency   [M48.062] Neurogenic claudication due to lumbar spinal stenosis   [M47.27] Osteoarthritis of spine with radiculopathy,  lumbosacral region

## 2024-06-28 DIAGNOSIS — I10 ESSENTIAL (PRIMARY) HYPERTENSION: ICD-10-CM

## 2024-07-02 RX ORDER — METOPROLOL SUCCINATE 25 MG/1
TABLET, EXTENDED RELEASE ORAL
Qty: 90 TABLET | Refills: 3 | Status: SHIPPED | OUTPATIENT
Start: 2024-07-02

## 2024-08-14 ENCOUNTER — TELEPHONE (OUTPATIENT)
Dept: PRIMARY CARE | Facility: CLINIC | Age: 84
End: 2024-08-14
Payer: MEDICARE

## 2024-09-06 DIAGNOSIS — E11.9 CONTROLLED TYPE 2 DIABETES MELLITUS WITHOUT COMPLICATION, WITHOUT LONG-TERM CURRENT USE OF INSULIN (MULTI): Chronic | ICD-10-CM

## 2024-09-09 DIAGNOSIS — E11.9 CONTROLLED TYPE 2 DIABETES MELLITUS WITHOUT COMPLICATION, WITHOUT LONG-TERM CURRENT USE OF INSULIN (MULTI): ICD-10-CM

## 2024-09-09 RX ORDER — METFORMIN HYDROCHLORIDE 1000 MG/1
1000 TABLET ORAL
Qty: 180 TABLET | Refills: 3 | Status: SHIPPED | OUTPATIENT
Start: 2024-09-09

## 2024-09-09 RX ORDER — INSULIN DEGLUDEC 100 U/ML
16 INJECTION, SOLUTION SUBCUTANEOUS EVERY MORNING
Qty: 3 ML | Refills: 11 | Status: SHIPPED | OUTPATIENT
Start: 2024-09-09

## 2024-09-11 DIAGNOSIS — E11.9 CONTROLLED TYPE 2 DIABETES MELLITUS WITHOUT COMPLICATION, WITHOUT LONG-TERM CURRENT USE OF INSULIN (MULTI): ICD-10-CM

## 2024-09-11 RX ORDER — SIMVASTATIN 40 MG/1
40 TABLET, FILM COATED ORAL NIGHTLY
Qty: 90 TABLET | Refills: 3 | Status: SHIPPED | OUTPATIENT
Start: 2024-09-11

## 2024-09-13 ASSESSMENT — ENCOUNTER SYMPTOMS
EYES NEGATIVE: 1
ENDOCRINE NEGATIVE: 1
FEVER: 0
DIFFICULTY URINATING: 0
COUGH: 0
ALLERGIC/IMMUNOLOGIC NEGATIVE: 1
SHORTNESS OF BREATH: 0
PSYCHIATRIC NEGATIVE: 1
NAUSEA: 0
CHILLS: 0

## 2024-09-13 NOTE — PROGRESS NOTES
Subjective   Patient ID: John Hicks is a 83 y.o. male.    HPI  Patient is here for 6 month follow up with PSA. Most recent PSA was 0.63 on 8/24. . Prior PSA was 0.53 on 9/23. Chronic BPH sx are mild and stable. Denies urgency and frequency. Denies dysuria. Denies hematuria. Nocturia x1. He was taking Flomax and Gemtesa in the past. He stopped both of these. Not sure they helped. ED is chronic and is not an issue.       Review of Systems   Constitutional:  Negative for chills and fever.   HENT: Negative.     Eyes: Negative.    Respiratory:  Negative for cough and shortness of breath.    Cardiovascular:  Negative for chest pain and leg swelling.   Gastrointestinal:  Negative for nausea.   Endocrine: Negative.    Genitourinary:  Negative for difficulty urinating.        Negative except for documented in HPI   Allergic/Immunologic: Negative.    Neurological:         Alert & oriented X 3   Hematological:         Denies blood thinners   Psychiatric/Behavioral: Negative.         Objective   Physical Exam  Vitals and nursing note reviewed.   Constitutional:       General: He is not in acute distress.     Appearance: Normal appearance.   Pulmonary:      Effort: Pulmonary effort is normal.   Abdominal:      Tenderness: There is no abdominal tenderness.   Genitourinary:     Comments: Kidneys non palpable bilaterally  Bladder non palpable or tender  Scrotum no mass, LARGE LEFT hydrocele  Epididymis- No spermatocele. Non Tender.  Testicles: No mass  Urethra: No discharge  Penis within normal limits... No lesions. No phimosis  Prostate - symmetric, no nodules. BENIGN  Seminal Vesicals: No mass.  Sphincter tone: normal  Neurological:      Mental Status: He is alert.         Assessment/Plan   Diagnoses and all orders for this visit:  Hydrocele in adult  Benign prostatic hyperplasia with urinary frequency  Hydrocele, unspecified hydrocele type  Nocturia  Urinary frequency    All available PSA values reviewed, Options discussed.  Questions answered.  Will follow PSA annually   Diet changes for prostate health discussed and educational information given. Pros/Cons of prostate health supplements discussed.   Treatment options for LUTS reviewed  discontinue Flomax and Gemtesa-Done  Discussed timed voiding. Discussed fluid and caffeine intake  Treatment options for ED reviewed-Observe  Lifestyle change to help prevent UTIs discussed. Encouraged fluid intake.  Past UA reviewed  Discussed options for Left Hydrocele-Will aspirate after Visit    F/U 6 months    After Visit I aspirated 400ml of clear yellow Fluid  Hydrocele   Area prepped with Betadine and draped. 18 gauge used to aspirate fluid. Sterile gauze placed over the puncture site… Please see comments for description of fluid and volume drained.

## 2024-09-18 ENCOUNTER — APPOINTMENT (OUTPATIENT)
Dept: UROLOGY | Facility: CLINIC | Age: 84
End: 2024-09-18
Payer: MEDICARE

## 2024-09-18 VITALS
HEIGHT: 71 IN | BODY MASS INDEX: 44.1 KG/M2 | WEIGHT: 315 LBS | SYSTOLIC BLOOD PRESSURE: 136 MMHG | DIASTOLIC BLOOD PRESSURE: 84 MMHG

## 2024-09-18 DIAGNOSIS — R35.0 URINARY FREQUENCY: ICD-10-CM

## 2024-09-18 DIAGNOSIS — N43.3 HYDROCELE, UNSPECIFIED HYDROCELE TYPE: ICD-10-CM

## 2024-09-18 DIAGNOSIS — N40.1 BENIGN PROSTATIC HYPERPLASIA WITH URINARY FREQUENCY: ICD-10-CM

## 2024-09-18 DIAGNOSIS — R35.1 NOCTURIA: ICD-10-CM

## 2024-09-18 DIAGNOSIS — N43.3 HYDROCELE IN ADULT: Primary | ICD-10-CM

## 2024-09-18 DIAGNOSIS — R35.0 BENIGN PROSTATIC HYPERPLASIA WITH URINARY FREQUENCY: ICD-10-CM

## 2024-09-18 PROCEDURE — 1036F TOBACCO NON-USER: CPT | Performed by: UROLOGY

## 2024-09-18 PROCEDURE — 99214 OFFICE O/P EST MOD 30 MIN: CPT | Performed by: UROLOGY

## 2024-09-18 PROCEDURE — 1159F MED LIST DOCD IN RCRD: CPT | Performed by: UROLOGY

## 2024-09-18 PROCEDURE — 55000 DRAINAGE OF HYDROCELE: CPT | Performed by: UROLOGY

## 2024-09-18 PROCEDURE — 3075F SYST BP GE 130 - 139MM HG: CPT | Performed by: UROLOGY

## 2024-09-18 PROCEDURE — 3079F DIAST BP 80-89 MM HG: CPT | Performed by: UROLOGY

## 2024-10-17 ENCOUNTER — APPOINTMENT (OUTPATIENT)
Dept: PRIMARY CARE | Facility: CLINIC | Age: 84
End: 2024-10-17
Payer: MEDICARE

## 2024-11-12 ENCOUNTER — APPOINTMENT (OUTPATIENT)
Dept: PRIMARY CARE | Facility: CLINIC | Age: 84
End: 2024-11-12
Payer: MEDICARE

## 2024-11-12 VITALS
HEART RATE: 92 BPM | BODY MASS INDEX: 43.26 KG/M2 | HEIGHT: 71 IN | SYSTOLIC BLOOD PRESSURE: 146 MMHG | WEIGHT: 309 LBS | DIASTOLIC BLOOD PRESSURE: 77 MMHG

## 2024-11-12 DIAGNOSIS — Z00.00 MEDICARE ANNUAL WELLNESS VISIT, SUBSEQUENT: ICD-10-CM

## 2024-11-12 DIAGNOSIS — G89.29 CHRONIC BILATERAL LOW BACK PAIN WITH BILATERAL SCIATICA: ICD-10-CM

## 2024-11-12 DIAGNOSIS — I10 PRIMARY HYPERTENSION: ICD-10-CM

## 2024-11-12 DIAGNOSIS — M54.41 CHRONIC BILATERAL LOW BACK PAIN WITH BILATERAL SCIATICA: ICD-10-CM

## 2024-11-12 DIAGNOSIS — M48.062 NEUROGENIC CLAUDICATION DUE TO LUMBAR SPINAL STENOSIS: ICD-10-CM

## 2024-11-12 DIAGNOSIS — M51.26 PROLAPSED LUMBAR DISC: ICD-10-CM

## 2024-11-12 DIAGNOSIS — N40.1 BENIGN PROSTATIC HYPERPLASIA WITH URINARY FREQUENCY: ICD-10-CM

## 2024-11-12 DIAGNOSIS — R35.0 BENIGN PROSTATIC HYPERPLASIA WITH URINARY FREQUENCY: ICD-10-CM

## 2024-11-12 DIAGNOSIS — Z23 NEED FOR INFLUENZA VACCINATION: Primary | ICD-10-CM

## 2024-11-12 DIAGNOSIS — E78.2 MIXED HYPERLIPIDEMIA: ICD-10-CM

## 2024-11-12 DIAGNOSIS — E11.9 CONTROLLED TYPE 2 DIABETES MELLITUS WITHOUT COMPLICATION, WITHOUT LONG-TERM CURRENT USE OF INSULIN (MULTI): Chronic | ICD-10-CM

## 2024-11-12 DIAGNOSIS — M54.42 CHRONIC BILATERAL LOW BACK PAIN WITH BILATERAL SCIATICA: ICD-10-CM

## 2024-11-12 PROCEDURE — 3077F SYST BP >= 140 MM HG: CPT | Performed by: INTERNAL MEDICINE

## 2024-11-12 PROCEDURE — 90662 IIV NO PRSV INCREASED AG IM: CPT | Performed by: INTERNAL MEDICINE

## 2024-11-12 PROCEDURE — 1036F TOBACCO NON-USER: CPT | Performed by: INTERNAL MEDICINE

## 2024-11-12 PROCEDURE — 1159F MED LIST DOCD IN RCRD: CPT | Performed by: INTERNAL MEDICINE

## 2024-11-12 PROCEDURE — G0008 ADMIN INFLUENZA VIRUS VAC: HCPCS | Performed by: INTERNAL MEDICINE

## 2024-11-12 PROCEDURE — 1123F ACP DISCUSS/DSCN MKR DOCD: CPT | Performed by: INTERNAL MEDICINE

## 2024-11-12 PROCEDURE — 99214 OFFICE O/P EST MOD 30 MIN: CPT | Performed by: INTERNAL MEDICINE

## 2024-11-12 PROCEDURE — 1158F ADVNC CARE PLAN TLK DOCD: CPT | Performed by: INTERNAL MEDICINE

## 2024-11-12 PROCEDURE — 3078F DIAST BP <80 MM HG: CPT | Performed by: INTERNAL MEDICINE

## 2024-11-12 PROCEDURE — 1160F RVW MEDS BY RX/DR IN RCRD: CPT | Performed by: INTERNAL MEDICINE

## 2024-11-12 PROCEDURE — G0439 PPPS, SUBSEQ VISIT: HCPCS | Performed by: INTERNAL MEDICINE

## 2024-11-12 ASSESSMENT — PATIENT HEALTH QUESTIONNAIRE - PHQ9
SUM OF ALL RESPONSES TO PHQ9 QUESTIONS 1 AND 2: 0
1. LITTLE INTEREST OR PLEASURE IN DOING THINGS: NOT AT ALL
2. FEELING DOWN, DEPRESSED OR HOPELESS: NOT AT ALL

## 2024-11-12 NOTE — PROGRESS NOTES
Chief Complaint: Medicare Wellness Exam/Comprehensive Problem Focused Follow Up and Physical Exam    HPI:  Patient is here today for MW.     Pt had vision change in his eye since Jan 24 when he had recurrence of bells palsy.   He has been following with opthomology.   Has a prism in his eye glasses now can drive.       Active Problem List  Patient Active Problem List   Diagnosis    Chronic back pain    Neurogenic claudication due to lumbar spinal stenosis    DM II (diabetes mellitus, type II), controlled (Multi)    HTN (hypertension)    Hyperlipidemia    Lumbosacral spondylosis    Numbness    Prolapsed lumbar disc    Benign prostatic hyperplasia with urinary frequency    Nocturia    Hydrocele in adult       Comprehensive Medical/Surgical/Social/Family History  Past Medical History:   Diagnosis Date    Other specified health status     No pertinent past medical history     Past Surgical History:   Procedure Laterality Date    OTHER SURGICAL HISTORY  04/12/2022    Gallbladder surgery    OTHER SURGICAL HISTORY  04/12/2022    Back surgery    OTHER SURGICAL HISTORY  04/12/2022    Tonsillectomy     Social History     Tobacco Use    Smoking status: Never    Smokeless tobacco: Never   Vaping Use    Vaping status: Never Used   Substance Use Topics    Alcohol use: Yes     Comment: Occasional    Drug use: Never     Family History   Problem Relation Name Age of Onset    Heart attack Mother      Lung cancer Father          non-small cell carcinoma of lung    Breast cancer Sister      Cancer Other           Allergies and Medications  Acetaminophen  Current Outpatient Medications on File Prior to Visit   Medication Sig Dispense Refill    aspirin 81 mg EC tablet Take 1 tablet (81 mg) by mouth once daily.      blood-glucose meter misc TEST SUGARS ONCE DAILY 1 each 0    celecoxib (CeleBREX) 100 mg capsule Take one pill twice daily for one week then take twice daily as needed thereafter 60 capsule 2    gabapentin (Neurontin) 300 mg  capsule Take 1 capsule (300 mg) by mouth 3 times a day.      glipiZIDE XL (Glucotrol XL) 5 mg 24 hr tablet Take 1 tablet (5 mg) by mouth once daily.      insulin degludec (Tresiba FlexTouch) 100 unit/mL (3 mL) injection Inject 16 Units under the skin once daily in the morning. 3 mL 11    lancets misc TEST SUGARS ONCE DAILY 50 each 11    metFORMIN (Glucophage) 1,000 mg tablet Take 1 tablet (1,000 mg) by mouth 2 times a day with meals. 180 tablet 3    metoprolol succinate XL (Toprol-XL) 25 mg 24 hr tablet TAKE 1 TABLET BY MOUTH ONCE DAILY 90 tablet 3    multivitamin tablet Take 1 tablet by mouth once daily.      OneTouch Delica Plus Lancet 30 gauge misc Use to test BLOOD SUGAR ONCE DAILY      OneTouch Verio test strips strip Use to test BLOOD SUGAR TWICE DAILY 100 strip 11    simvastatin (Zocor) 40 mg tablet TAKE 1 TABLET BY MOUTH AT BEDTIME 90 tablet 3    [DISCONTINUED] methocarbamol (Robaxin) 500 mg tablet TAKE 1 TABLET BY MOUTH FOUR TIMES DAILY AS NEEDED FOR MUSCLE SPASMS (Patient not taking: Reported on 11/12/2024) 40 tablet 0    [DISCONTINUED] tamsulosin (Flomax) 0.4 mg 24 hr capsule Take 1 capsule (0.4 mg) by mouth once daily. (Patient not taking: Reported on 11/12/2024) 30 capsule 11     No current facility-administered medications on file prior to visit.       Medicare Wellness Questionnaire        How have you been on Medicare less than a year ? No  Have you had a Medicare Wellness exam before ? Yes  Have you had any surgeries in the last year ? No  Have you developed any new diseases in the last year ? No  Have any close family members developed new diseases in the last year ? No  Have you been to a the hospital in the last year ? No  Do you take any pills or supplements other than those prescribed for you ? Yes  Do you take any opiates for pain such as Tramadol, Percocet or Norco ? No  How do you consider your overall health ?Good  Have you ever used tobacco products ? Yes   Have you smoked more than 100  "cigarettes in your life ?  Yes  What form of tobacco have you used ? Cigarettes  How many packs per day ? Cigars for a few years  How many years ? Smoked a few years in the    Have you quit ? Yes  Do you drink alcohol ?  No   Have you ever used illegal drugs at anytime in your life including Marijuana ? No   Which of the following describes your diet ?Well balanced  How many days per week on average do you exercise ? 0 days  Do you have any loss of hearing ? No  Do you have hearing aids ? No  Have you or others noted you have loss of memory ? No  Do you need someone to assist you with any of the following ? None   Do you need someone to assist you with any of the following ? None   Have you fallen in the last 6 months ? No  Do you have any of the following in your house ?  None   Do you have a living will ? Yes  Do you have a durable power of  for health care decisions ? Yes    Medications and Supplements  prescribed by me and other practitioners or clinical pharmacist (such as prescriptions, OTC's, herbal therapies and supplements) were reviewed and documented in the medical record.    Tobacco/Alcohol/Opioid use, as well as Illicit Drug Use was screened for/reviewed and documented in Social History section and medication list as appropriate  Activities of Daily Living  In your present state of health, do you have any difficulty performing the following activities?:   Preparing food and eating?: No  Bathing yourself: No  Getting dressed: No  Using the toilet:No  Moving around from place to place: No  In the past year have you fallen or had a near fall?:No    Depression Screen  (Note: if answer to either of the following is \"Yes\", then a more complete depression screening is indicated)   Q1: Over the past two weeks, have you felt down, depressed or hopeless?   NO   Q2: Over the past two weeks, have you felt little interest or pleasure in doing things?   NO     Current exercise habits: The patient does " "not participate in regular exercise at present.   Dietary issues discussed: Yes  Hearing difficulties: No  Safe in current home environment: yes  Visual Acuity assessed: no  Cognitive Impairment assessed: yes       Advance directives  Advanced Care Planning (including a Living Will, Healthcare POA, as well as specific end of life choices and/or directives), was discussed for approximately 1 minutes with the patient and/or surrogate, voluntarily, and documented in the medical record.     Cardiac Risk Assessment  Cardiovascular risk was discussed and, if needed, lifestyle modifications recommended, including nutritional choices, exercise, and elimination of habits contributing to risk. We agreed on a plan to reduce the current cardiovascular risk based on above discussion as needed.  Aspirin use/disuse was discussed after reviewing the updated guidelines below:    Consider low dose Aspirin ( mg) use if the benefit for cardiovascular disease prevention outweighs risk for bleeding complications.   In general, low dose ASA should be considered:  In patients WITHOUT prior MI/stroke/PAD (primary prevention):   a. Age <60: Use if 10-year cardiovascular disease risk >20%, with discussion of risks and benefits with patient  b. Age 60-<70: Use if 10-year cardiovascular disease risk >20% and low bleeding (e.g., gastrointenstinal) risk, with discussion of risks and benefits with patient  c. Age >=70: Do not use    In patients WITH prior MI/stroke/PAD (secondary prevention):   Generally use unless extremely high bleeding (e.g., gastrointenstinal) risk, with discussion of risks and benefits with patient    ROS otherwise negative aside from what was mentioned above in HPI.    Vitals  /77   Pulse 92   Ht 1.803 m (5' 11\")   Wt 140 kg (309 lb)   BMI 43.10 kg/m²   Body mass index is 43.1 kg/m².  Physical Exam  Gen: Alert, NAD  HEENT:  PERRLA, EOMI, conjunctiva and sclera normal in appearance.   Neck:  Supple with FROM; " No masses/nodes palpable; Thyroid nontender and without nodules; No ANJANA  Respiratory:  Lungs CTAB  Cardiovascular:  Heart RRR. No M/R/G. Peripheral pulses equal bilaterally  Abdomen:  Soft, nontender, BS present throughout; No R/G/R; No HSM or masses palpated  Extremities:  FROM all extremities; Muscle strength grossly normal with good tone  Neuro:  CN II-XII intact; Reflexes 2+/2+; Gross motor and sensory intact  Skin:  No suspicious lesions present      Assessment and Plan:  Problem List Items Addressed This Visit       Chronic back pain    Neurogenic claudication due to lumbar spinal stenosis    DM II (diabetes mellitus, type II), controlled (Multi) (Chronic)    HTN (hypertension)    Hyperlipidemia    Prolapsed lumbar disc    Benign prostatic hyperplasia with urinary frequency     Other Visit Diagnoses       Need for influenza vaccination    -  Primary    Relevant Orders    Flu vaccine, trivalent, preservative free, HIGH-DOSE, age 65y+ (Fluzone) (Completed)    Medicare annual wellness visit, subsequent                DMII   - seeing Endocrinology  - A1c 6.6% in 1/24 in Endos office, 7.1% , Has appt in Jan 25    - continue glipizide 5mg po daily   - continue lantus 15 units   - continue metformin 1000mg po bid    Pt needs to test 3-4 x a day or more as he uses insulin and can have hyperglycemic or hypoglycemic episodes.           2. Neurogenic claudication due to lumbar spinal stenosis   - following with pain management   - did not complete pT because he got bells palsy which has happened a few other times after he has started Pt      3. HLD   - continue simvastatin 40mg po daily     4. HTN   - continue metoprolol xl po daily     5. Given flu shot today     Advised pt that I will be leaving  in March of 25  and will need to find a new provider after that.     During the course of the visit the patient was educated and counseled about age appropriate screening and preventive services. Completed preventive  screenings were documented in the chart and orders were placed for outstanding screenings/procedures as documented in the Assessment and Plan.      Patient Instructions (the written plan) was given to the patient at check out.      Agata Bauman DO

## 2024-12-10 ENCOUNTER — TELEPHONE (OUTPATIENT)
Dept: PRIMARY CARE | Facility: CLINIC | Age: 84
End: 2024-12-10
Payer: MEDICARE

## 2024-12-10 ENCOUNTER — HOSPITAL ENCOUNTER (EMERGENCY)
Facility: HOSPITAL | Age: 84
Discharge: HOME | End: 2024-12-10
Attending: EMERGENCY MEDICINE
Payer: MEDICARE

## 2024-12-10 VITALS
OXYGEN SATURATION: 94 % | RESPIRATION RATE: 18 BRPM | SYSTOLIC BLOOD PRESSURE: 145 MMHG | HEART RATE: 67 BPM | DIASTOLIC BLOOD PRESSURE: 79 MMHG | TEMPERATURE: 96.6 F

## 2024-12-10 DIAGNOSIS — M54.40 ACUTE LEFT-SIDED LOW BACK PAIN WITH SCIATICA, SCIATICA LATERALITY UNSPECIFIED: Primary | ICD-10-CM

## 2024-12-10 DIAGNOSIS — M54.30 SCIATICA, UNSPECIFIED LATERALITY: Primary | ICD-10-CM

## 2024-12-10 DIAGNOSIS — M47.817 DJD (DEGENERATIVE JOINT DISEASE), LUMBOSACRAL: ICD-10-CM

## 2024-12-10 DIAGNOSIS — M51.26 PROLAPSED LUMBAR DISC: ICD-10-CM

## 2024-12-10 PROCEDURE — 96372 THER/PROPH/DIAG INJ SC/IM: CPT | Performed by: EMERGENCY MEDICINE

## 2024-12-10 PROCEDURE — 99284 EMERGENCY DEPT VISIT MOD MDM: CPT | Performed by: EMERGENCY MEDICINE

## 2024-12-10 PROCEDURE — 2500000004 HC RX 250 GENERAL PHARMACY W/ HCPCS (ALT 636 FOR OP/ED): Performed by: EMERGENCY MEDICINE

## 2024-12-10 RX ORDER — CYCLOBENZAPRINE HCL 5 MG
5 TABLET ORAL NIGHTLY PRN
Qty: 30 TABLET | Refills: 0 | Status: SHIPPED | OUTPATIENT
Start: 2024-12-10 | End: 2025-02-08

## 2024-12-10 RX ORDER — METHYLPREDNISOLONE 4 MG/1
TABLET ORAL
Qty: 21 TABLET | Refills: 0 | Status: SHIPPED | OUTPATIENT
Start: 2024-12-10 | End: 2024-12-16

## 2024-12-10 RX ORDER — OXYCODONE AND ACETAMINOPHEN 5; 325 MG/1; MG/1
1 TABLET ORAL EVERY 8 HOURS PRN
Qty: 20 TABLET | Refills: 0 | Status: CANCELLED | OUTPATIENT
Start: 2024-12-10 | End: 2024-12-17

## 2024-12-10 RX ORDER — HYDROMORPHONE HYDROCHLORIDE 1 MG/ML
1 INJECTION, SOLUTION INTRAMUSCULAR; INTRAVENOUS; SUBCUTANEOUS ONCE
Status: COMPLETED | OUTPATIENT
Start: 2024-12-10 | End: 2024-12-10

## 2024-12-10 RX ORDER — OXYCODONE AND ACETAMINOPHEN 5; 325 MG/1; MG/1
1 TABLET ORAL EVERY 6 HOURS PRN
Qty: 15 TABLET | Refills: 0 | Status: SHIPPED | OUTPATIENT
Start: 2024-12-10

## 2024-12-10 ASSESSMENT — ENCOUNTER SYMPTOMS
PSYCHIATRIC NEGATIVE: 1
ABDOMINAL PAIN: 0
CONSTITUTIONAL NEGATIVE: 1
NECK PAIN: 0
ARTHRALGIAS: 1
VOMITING: 0
NAUSEA: 0
NUMBNESS: 0
EYES NEGATIVE: 1
MYALGIAS: 1
HEMATOLOGIC/LYMPHATIC NEGATIVE: 1
BACK PAIN: 1
FLANK PAIN: 0
WEAKNESS: 0
CARDIOVASCULAR NEGATIVE: 1
NECK STIFFNESS: 0
RESPIRATORY NEGATIVE: 1
DIFFICULTY URINATING: 0

## 2024-12-10 ASSESSMENT — PAIN DESCRIPTION - ORIENTATION
ORIENTATION: LEFT
ORIENTATION: LEFT

## 2024-12-10 ASSESSMENT — COLUMBIA-SUICIDE SEVERITY RATING SCALE - C-SSRS
1. IN THE PAST MONTH, HAVE YOU WISHED YOU WERE DEAD OR WISHED YOU COULD GO TO SLEEP AND NOT WAKE UP?: NO
6. HAVE YOU EVER DONE ANYTHING, STARTED TO DO ANYTHING, OR PREPARED TO DO ANYTHING TO END YOUR LIFE?: NO
2. HAVE YOU ACTUALLY HAD ANY THOUGHTS OF KILLING YOURSELF?: NO

## 2024-12-10 ASSESSMENT — PAIN DESCRIPTION - LOCATION
LOCATION: BACK
LOCATION: BACK

## 2024-12-10 ASSESSMENT — PAIN SCALES - GENERAL
PAINLEVEL_OUTOF10: 4
PAINLEVEL_OUTOF10: 4
PAINLEVEL_OUTOF10: 1

## 2024-12-10 ASSESSMENT — PAIN DESCRIPTION - DESCRIPTORS: DESCRIPTORS: ACHING;SHARP

## 2024-12-10 ASSESSMENT — PAIN DESCRIPTION - PAIN TYPE: TYPE: ACUTE PAIN

## 2024-12-10 ASSESSMENT — PAIN - FUNCTIONAL ASSESSMENT: PAIN_FUNCTIONAL_ASSESSMENT: 0-10

## 2024-12-10 NOTE — DISCHARGE INSTRUCTIONS
ICE TO BACK  START MEDROL DOSE PACK-WATCH BLOOD SUGAR CLOSESLY  START FLEXERIL FOR MUSCLE SPASM-WATCH FOR SLEEPINESS  START PERCOCET FOR PAIN

## 2024-12-10 NOTE — ED PROVIDER NOTES
Chief Complaint: BACK AND LEFT HIP PAIN    This is an 83-year-old male who suffers from chronic back pain dating back to 1985 when he had a laminectomy he has had chronic back problems he has had dilations by pain management.  Schedule again for pain management evaluation on the 17th but the pain got so bad that he could not wait till then complains of pain in the lower back rating to the left hip.  It is worse with certain positions he denies any paresthesias of the lower leg or any numbness lower leg no change in bowel or urine habits and presents now for evaluation he does admit to being overweight           Review of Systems   Constitutional: Negative.    HENT: Negative.     Eyes: Negative.    Respiratory: Negative.     Cardiovascular: Negative.    Gastrointestinal:  Negative for abdominal pain, nausea and vomiting.   Genitourinary:  Negative for difficulty urinating and flank pain.   Musculoskeletal:  Positive for arthralgias, back pain and myalgias. Negative for neck pain and neck stiffness.   Skin:  Negative for rash.   Neurological:  Negative for weakness and numbness.   Hematological: Negative.    Psychiatric/Behavioral: Negative.     All other systems reviewed and are negative.       Physical Exam  Vitals and nursing note reviewed.   Constitutional:       General: He is not in acute distress.     Appearance: He is obese. He is not toxic-appearing.      Comments: Is a heavyset white male in some mild discomfort but not toxic or an extremis   HENT:      Head: Normocephalic and atraumatic.      Nose: Nose normal.      Mouth/Throat:      Mouth: Mucous membranes are dry.      Pharynx: Oropharynx is clear.   Eyes:      Extraocular Movements: Extraocular movements intact.      Conjunctiva/sclera: Conjunctivae normal.      Pupils: Pupils are equal, round, and reactive to light.   Cardiovascular:      Rate and Rhythm: Normal rate.      Pulses: Normal pulses.   Pulmonary:      Effort: Pulmonary effort is normal.       Breath sounds: Normal breath sounds.   Abdominal:      General: Bowel sounds are normal.      Palpations: Abdomen is soft. There is no mass.      Tenderness: There is no abdominal tenderness. There is no right CVA tenderness or left CVA tenderness.   Musculoskeletal:         General: No deformity. Normal range of motion.      Cervical back: Normal range of motion and neck supple.      Lumbar back: Tenderness and bony tenderness present. No swelling or deformity. Negative right straight leg raise test.        Back:       Right lower leg: No edema.      Left lower leg: No edema.   Skin:     General: Skin is warm and dry.      Capillary Refill: Capillary refill takes less than 2 seconds.      Findings: No erythema or rash.   Neurological:      Mental Status: He is alert.          Labs Reviewed - No data to display     No orders to display        Procedures     Medical Decision Making  Differential diagnose include lumbar radiculopathy sciatica degenerative joint disease.  MRI from 2022 was reviewed reviewed and showed significant degenerative changes but no obvious disc issue at that time he did have findings consistent with a sciatica today with a positive straight leg raising he received Dilaudid 1 mg IM and Solu-Medrol 125 mg IM at this time.  She had significant relief with Solu-Medrol and Dilaudid.  His primary care physician had called in prescriptions for Medrol Dosepak Percocet as well as Flexeril and he will follow-up with pain management as scheduled         Diagnoses as of 12/10/24 1342   Acute left-sided low back pain with sciatica, sciatica laterality unspecified   DJD (degenerative joint disease), lumbosacral                    Ab Gillette MD  12/10/24 [id number]

## 2024-12-10 NOTE — TELEPHONE ENCOUNTER
Pts wife LVM stating her  is in so much pain due to his back he can't really walk. Tylenol is not cutting it and they are requesting percocet until he can see pain management 12/17

## 2024-12-17 ENCOUNTER — OFFICE VISIT (OUTPATIENT)
Dept: PAIN MEDICINE | Facility: CLINIC | Age: 84
End: 2024-12-17
Payer: MEDICARE

## 2024-12-17 VITALS
RESPIRATION RATE: 20 BRPM | BODY MASS INDEX: 42.68 KG/M2 | SYSTOLIC BLOOD PRESSURE: 135 MMHG | HEART RATE: 88 BPM | DIASTOLIC BLOOD PRESSURE: 94 MMHG | WEIGHT: 306 LBS

## 2024-12-17 DIAGNOSIS — M48.062 NEUROGENIC CLAUDICATION DUE TO LUMBAR SPINAL STENOSIS: Primary | ICD-10-CM

## 2024-12-17 DIAGNOSIS — M47.27 OSTEOARTHRITIS OF SPINE WITH RADICULOPATHY, LUMBOSACRAL REGION: ICD-10-CM

## 2024-12-17 PROCEDURE — 99214 OFFICE O/P EST MOD 30 MIN: CPT

## 2024-12-17 RX ORDER — LIDOCAINE HYDROCHLORIDE 20 MG/ML
6 INJECTION, SOLUTION EPIDURAL; INFILTRATION; INTRACAUDAL; PERINEURAL ONCE
OUTPATIENT
Start: 2024-12-17 | End: 2024-12-17

## 2024-12-17 RX ORDER — LIDOCAINE HYDROCHLORIDE 20 MG/ML
1 INJECTION, SOLUTION EPIDURAL; INFILTRATION; INTRACAUDAL; PERINEURAL ONCE
OUTPATIENT
Start: 2024-12-17 | End: 2024-12-17

## 2024-12-17 RX ORDER — SODIUM CHLORIDE 9 MG/ML
1 INJECTION, SOLUTION INTRAMUSCULAR; INTRAVENOUS; SUBCUTANEOUS ONCE
OUTPATIENT
Start: 2024-12-17 | End: 2024-12-17

## 2024-12-17 RX ORDER — DEXAMETHASONE SODIUM PHOSPHATE 10 MG/ML
10 INJECTION INTRAMUSCULAR; INTRAVENOUS ONCE
OUTPATIENT
Start: 2024-12-17 | End: 2024-12-17

## 2024-12-17 ASSESSMENT — ENCOUNTER SYMPTOMS
SHORTNESS OF BREATH: 0
LIGHT-HEADEDNESS: 0
PALPITATIONS: 0
WHEEZING: 0
COUGH: 0
ARTHRALGIAS: 0
ENDOCRINE NEGATIVE: 1
ADENOPATHY: 0
BACK PAIN: 1
WEAKNESS: 0
CONSTITUTIONAL NEGATIVE: 1
FACIAL ASYMMETRY: 0
DYSPHORIC MOOD: 0
BRUISES/BLEEDS EASILY: 0
EYES NEGATIVE: 1
ALLERGIC/IMMUNOLOGIC NEGATIVE: 1
MYALGIAS: 1

## 2024-12-17 ASSESSMENT — COLUMBIA-SUICIDE SEVERITY RATING SCALE - C-SSRS
6. HAVE YOU EVER DONE ANYTHING, STARTED TO DO ANYTHING, OR PREPARED TO DO ANYTHING TO END YOUR LIFE?: NO
2. HAVE YOU ACTUALLY HAD ANY THOUGHTS OF KILLING YOURSELF?: NO
1. IN THE PAST MONTH, HAVE YOU WISHED YOU WERE DEAD OR WISHED YOU COULD GO TO SLEEP AND NOT WAKE UP?: NO

## 2024-12-17 NOTE — PROGRESS NOTES
"Subjective   Patient ID: John Hicks is a 84 y.o. male who presents for Back Pain (RETURNING WITH LEFT SCIATIC PAIN THAT RETURNED WITHIN THE PAST TWO WEEKS, CONSTANT ACHE WITH SHARP PAIN IF HE ROLLS OVER IN BED AT NIGHT HE WILL REPOSITION, PAIN WITH STANDING,WALKING,TRANSITIONING,STAIRS, HE WAS GIVEN OXYCODONE AND STEROIDS FOR RELIEF IN THE ER, HE TAKES ALEVE PRN, ).HE AMBULATES WITH A QUAD CANE, PAIN SCORE SITTING 2/10, PAIN SCORE WITH ACTIVITY 6/10, DEP NO, FALLS NO, SMOKING NO, RAMA=44%, SOAPP=1  [place] Main, [facility] 12/17/24 10:59 AM     The patient is an 84-year-old male who presents today after a significant hiatus with returning low back pain.  He was a patient of Dr. Quintero in the past.  He says that around 2 weeks ago the pain returned, denies any sort of inciting injury or event and has intermittent left leg \"sciatica\" pain.  He has increased pain with standing, walking, and any sort of activity, and will have to reposition at night to try to help with the pain.  He currently rates it a 2/10, says it can get up to 6/10 at its worst.  He has had previous back surgery, he has had injections in the past, and was recently seen in the ER and given a Medrol Dosepak, Percocet, and Flexeril, none of which he felt really helped with the pain.  He is wondering what can be done to help with this pain.        Review of Systems   Constitutional: Negative.    HENT: Negative.     Eyes: Negative.    Respiratory:  Negative for cough, shortness of breath and wheezing.    Cardiovascular:  Negative for chest pain, palpitations and leg swelling.   Endocrine: Negative.    Genitourinary: Negative.    Musculoskeletal:  Positive for back pain and myalgias. Negative for arthralgias.   Skin: Negative.    Allergic/Immunologic: Negative.    Neurological:  Negative for facial asymmetry, weakness and light-headedness.   Hematological:  Negative for adenopathy. Does not bruise/bleed easily.   Psychiatric/Behavioral:  Negative for " dysphoric mood and suicidal ideas.        Objective   Physical Exam  Constitutional:       General: He is not in acute distress.     Appearance: Normal appearance.   HENT:      Head: Normocephalic.      Mouth/Throat:      Mouth: Mucous membranes are moist.   Eyes:      Extraocular Movements: Extraocular movements intact.   Cardiovascular:      Rate and Rhythm: Normal rate and regular rhythm.      Pulses: Normal pulses.      Heart sounds: Normal heart sounds. No murmur heard.     No friction rub. No gallop.   Pulmonary:      Effort: Pulmonary effort is normal.      Breath sounds: Normal breath sounds. No wheezing, rhonchi or rales.   Abdominal:      General: Abdomen is flat.      Palpations: Abdomen is soft.   Musculoskeletal:      Cervical back: Normal range of motion.      Right lower leg: No edema.      Left lower leg: No edema.      Comments: Ambulates with a cane  Strength 4/5 BLE  No lumbar paraspinal tenderness to palpation  Kathy finger mildly positive left, negative right  Facet loading positive  SLR positive bilaterally   Lymphadenopathy:      Cervical: No cervical adenopathy.   Skin:     General: Skin is warm and dry.   Neurological:      General: No focal deficit present.      Mental Status: He is alert and oriented to person, place, and time. Mental status is at baseline.   Psychiatric:         Mood and Affect: Mood normal.         Behavior: Behavior normal.         Assessment/Plan   Diagnoses and all orders for this visit:  Neurogenic claudication due to lumbar spinal stenosis  -     FL pain management; Future  -     Transforaminal; Future  Osteoarthritis of spine with radiculopathy, lumbosacral region  Other orders  -     iohexol (OMNIPaque) 300 mg iodine/mL solution 6 mL  -     lidocaine PF (Xylocaine) 20 mg/mL (2 %) injection 120 mg  -     lidocaine PF (Xylocaine) 20 mg/mL (2 %) injection 20 mg  -     sodium chloride (PF) 0.9% solution 1 mL  -     dexAMETHasone (PF) (Decadron) injection 10 mg  -      NPO Diet Except: Sips with meds; Effective now; Standing  -     Height and weight; Standing  -     POCT Glucose; Standing  -     Type And Screen; Standing  -     Inpatient consult to Respiratory Care; Standing  -     Adult diet Regular; Standing  -     Vital Signs; Standing  -     Notify provider (specify parameters); Standing  -     Continue IV fluids ordered pre-procedure; Standing  -     Prior to Discharge O2 Weaning; Standing  -     Pulse oximetry, continuous; Standing  -     Discharge patient; Standing       The patient is an 84-year-old male with a past medical history significant for the above-mentioned problems.  He says the pain interferes with his ability to function at home, as he is unable to ambulate longer than 50 feet without significantly increased pain.  We discussed different options including medication management and injections.  He says he has tried injections in the past, and did not find much relief from this, so he is hesitant to repeat them, but is willing to try just 1 more injection to see if he gets relief.  We reviewed his MRI from 2 years ago, he says he has not had any significant injuries or events since that time.  We will pursue bilateral L4-5 transforaminal epidural steroid injection under fluoroscopy.  The procedure was discussed, risks and benefits were discussed, patient is agreeable.  He will hold his aspirin for 6 days, vitamin/supplements for 1 week.  He will follow-up after the injection for reevaluation, call the clinic sooner if needed.

## 2024-12-24 ENCOUNTER — TELEPHONE (OUTPATIENT)
Dept: PAIN MEDICINE | Facility: CLINIC | Age: 84
End: 2024-12-24
Payer: MEDICARE

## 2024-12-24 NOTE — TELEPHONE ENCOUNTER
John Skinner would like to proceed with a lumbar TFESI with Dr. Valdivia in the near future.  Is it acceptable for him to hold his Aspirin for 6 days prior to injection and 24 hours after?  Please advise.  Thanks!

## 2025-01-17 ENCOUNTER — HOSPITAL ENCOUNTER (OUTPATIENT)
Dept: OPERATING ROOM | Facility: HOSPITAL | Age: 85
Setting detail: OUTPATIENT SURGERY
Discharge: HOME | End: 2025-01-17
Payer: MEDICARE

## 2025-01-17 VITALS
SYSTOLIC BLOOD PRESSURE: 152 MMHG | OXYGEN SATURATION: 96 % | HEIGHT: 73 IN | BODY MASS INDEX: 40.69 KG/M2 | WEIGHT: 307 LBS | HEART RATE: 83 BPM | RESPIRATION RATE: 16 BRPM | TEMPERATURE: 97.9 F | DIASTOLIC BLOOD PRESSURE: 94 MMHG

## 2025-01-17 DIAGNOSIS — M48.062 NEUROGENIC CLAUDICATION DUE TO LUMBAR SPINAL STENOSIS: ICD-10-CM

## 2025-01-17 LAB — GLUCOSE BLD MANUAL STRIP-MCNC: 183 MG/DL (ref 74–99)

## 2025-01-17 PROCEDURE — 64483 NJX AA&/STRD TFRM EPI L/S 1: CPT | Mod: 50 | Performed by: STUDENT IN AN ORGANIZED HEALTH CARE EDUCATION/TRAINING PROGRAM

## 2025-01-17 PROCEDURE — 82947 ASSAY GLUCOSE BLOOD QUANT: CPT

## 2025-01-17 PROCEDURE — 2550000001 HC RX 255 CONTRASTS: Performed by: STUDENT IN AN ORGANIZED HEALTH CARE EDUCATION/TRAINING PROGRAM

## 2025-01-17 PROCEDURE — 2500000004 HC RX 250 GENERAL PHARMACY W/ HCPCS (ALT 636 FOR OP/ED): Performed by: STUDENT IN AN ORGANIZED HEALTH CARE EDUCATION/TRAINING PROGRAM

## 2025-01-17 RX ORDER — BUPIVACAINE HYDROCHLORIDE 5 MG/ML
INJECTION, SOLUTION EPIDURAL; INTRACAUDAL AS NEEDED
Status: COMPLETED | OUTPATIENT
Start: 2025-01-17 | End: 2025-01-17

## 2025-01-17 RX ORDER — LIDOCAINE HYDROCHLORIDE 20 MG/ML
INJECTION, SOLUTION EPIDURAL; INFILTRATION; INTRACAUDAL; PERINEURAL AS NEEDED
Status: COMPLETED | OUTPATIENT
Start: 2025-01-17 | End: 2025-01-17

## 2025-01-17 RX ORDER — METHYLPREDNISOLONE ACETATE 40 MG/ML
INJECTION, SUSPENSION INTRA-ARTICULAR; INTRALESIONAL; INTRAMUSCULAR; SOFT TISSUE AS NEEDED
Status: COMPLETED | OUTPATIENT
Start: 2025-01-17 | End: 2025-01-17

## 2025-01-17 RX ADMIN — IOHEXOL 1 ML: 300 INJECTION, SOLUTION INTRAVENOUS at 14:01

## 2025-01-17 RX ADMIN — METHYLPREDNISOLONE ACETATE 40 MG: 40 INJECTION, SUSPENSION INTRA-ARTICULAR; INTRALESIONAL; INTRAMUSCULAR; SOFT TISSUE at 14:00

## 2025-01-17 RX ADMIN — BUPIVACAINE HYDROCHLORIDE 2 ML: 5 INJECTION, SOLUTION EPIDURAL; INTRACAUDAL; PERINEURAL at 14:00

## 2025-01-17 RX ADMIN — LIDOCAINE HYDROCHLORIDE 3 ML: 20 INJECTION, SOLUTION EPIDURAL; INFILTRATION; INTRACAUDAL; PERINEURAL at 14:01

## 2025-01-17 ASSESSMENT — PAIN SCALES - GENERAL
PAINLEVEL_OUTOF10: 0 - NO PAIN
PAINLEVEL_OUTOF10: 7

## 2025-01-17 ASSESSMENT — PAIN - FUNCTIONAL ASSESSMENT: PAIN_FUNCTIONAL_ASSESSMENT: 0-10

## 2025-01-17 NOTE — H&P
"History Of Present Illness  John Hicks is a 84 y.o. male presenting with pain.     Past Medical History  Past Medical History:   Diagnosis Date    Diabetes mellitus (Multi)     Hypertension     Other specified health status     No pertinent past medical history       Surgical History  Past Surgical History:   Procedure Laterality Date    OTHER SURGICAL HISTORY  04/12/2022    Gallbladder surgery    OTHER SURGICAL HISTORY  04/12/2022    Back surgery    OTHER SURGICAL HISTORY  04/12/2022    Tonsillectomy        Social History  He reports that he has never smoked. He has never used smokeless tobacco. He reports current alcohol use. He reports that he does not use drugs.    Family History  Family History   Problem Relation Name Age of Onset    Heart attack Mother      Lung cancer Father          non-small cell carcinoma of lung    Breast cancer Sister      Cancer Other          Allergies  Acetaminophen    Review of Systems   All other systems reviewed and are negative.       Physical Exam     Last Recorded Vitals  Blood pressure 176/73, pulse 76, temperature 36.5 °C (97.7 °F), resp. rate 16, height 1.854 m (6' 1\"), weight 139 kg (307 lb), SpO2 97%.    Relevant Results        Constitutional: No acute distress, well appearing and well nourished. Patient appears stated age.  Eyes:  nonicteric sclerae   ENT: Hearing is grossly intact.  Neck: trachea midline  Head and Face: grossly normal.  Respiratory: nonlabored breathing  Cardiovascular: rate per vitals.  Neuro: alert, moving extremities.       Assessment/Plan   Assessment & Plan  Neurogenic claudication due to lumbar spinal stenosis      Bilateral ltr       I spent   minutes in the professional and overall care of this patient.      Jaime Valdivia, DO    "

## 2025-01-17 NOTE — PERIOPERATIVE NURSING NOTE
Discharge instructions reviewed by Deyanira brock questions and verbalized understanding.   discharged amb steady gait, to exit   to be driven home by Radha vicente

## 2025-02-06 ENCOUNTER — OFFICE VISIT (OUTPATIENT)
Dept: PAIN MEDICINE | Facility: CLINIC | Age: 85
End: 2025-02-06
Payer: MEDICARE

## 2025-02-06 VITALS
DIASTOLIC BLOOD PRESSURE: 86 MMHG | SYSTOLIC BLOOD PRESSURE: 164 MMHG | WEIGHT: 314 LBS | HEART RATE: 80 BPM | BODY MASS INDEX: 41.43 KG/M2 | RESPIRATION RATE: 18 BRPM

## 2025-02-06 DIAGNOSIS — G89.29 CHRONIC BILATERAL LOW BACK PAIN WITH BILATERAL SCIATICA: ICD-10-CM

## 2025-02-06 DIAGNOSIS — M47.27 OSTEOARTHRITIS OF SPINE WITH RADICULOPATHY, LUMBOSACRAL REGION: ICD-10-CM

## 2025-02-06 DIAGNOSIS — M54.16 LUMBAR RADICULOPATHY: Primary | ICD-10-CM

## 2025-02-06 DIAGNOSIS — M54.42 CHRONIC BILATERAL LOW BACK PAIN WITH BILATERAL SCIATICA: ICD-10-CM

## 2025-02-06 DIAGNOSIS — R20.0 NUMBNESS: ICD-10-CM

## 2025-02-06 DIAGNOSIS — M48.062 NEUROGENIC CLAUDICATION DUE TO LUMBAR SPINAL STENOSIS: ICD-10-CM

## 2025-02-06 DIAGNOSIS — M54.41 CHRONIC BILATERAL LOW BACK PAIN WITH BILATERAL SCIATICA: ICD-10-CM

## 2025-02-06 PROCEDURE — 99213 OFFICE O/P EST LOW 20 MIN: CPT | Performed by: PHYSICIAN ASSISTANT

## 2025-02-06 ASSESSMENT — COLUMBIA-SUICIDE SEVERITY RATING SCALE - C-SSRS
2. HAVE YOU ACTUALLY HAD ANY THOUGHTS OF KILLING YOURSELF?: NO
6. HAVE YOU EVER DONE ANYTHING, STARTED TO DO ANYTHING, OR PREPARED TO DO ANYTHING TO END YOUR LIFE?: NO
1. IN THE PAST MONTH, HAVE YOU WISHED YOU WERE DEAD OR WISHED YOU COULD GO TO SLEEP AND NOT WAKE UP?: NO

## 2025-02-06 ASSESSMENT — ENCOUNTER SYMPTOMS
HEMATOLOGIC/LYMPHATIC NEGATIVE: 1
ALLERGIC/IMMUNOLOGIC NEGATIVE: 1
NUMBNESS: 1
EYES NEGATIVE: 1
CARDIOVASCULAR NEGATIVE: 1
PSYCHIATRIC NEGATIVE: 1
ENDOCRINE NEGATIVE: 1
RESPIRATORY NEGATIVE: 1
GASTROINTESTINAL NEGATIVE: 1
BACK PAIN: 1
MYALGIAS: 1
CONSTITUTIONAL NEGATIVE: 1
WEAKNESS: 1
ARTHRALGIAS: 1

## 2025-02-06 ASSESSMENT — PATIENT HEALTH QUESTIONNAIRE - PHQ9
SUM OF ALL RESPONSES TO PHQ9 QUESTIONS 1 AND 2: 2
1. LITTLE INTEREST OR PLEASURE IN DOING THINGS: SEVERAL DAYS
2. FEELING DOWN, DEPRESSED OR HOPELESS: SEVERAL DAYS
10. IF YOU CHECKED OFF ANY PROBLEMS, HOW DIFFICULT HAVE THESE PROBLEMS MADE IT FOR YOU TO DO YOUR WORK, TAKE CARE OF THINGS AT HOME, OR GET ALONG WITH OTHER PEOPLE: SOMEWHAT DIFFICULT

## 2025-02-06 NOTE — PROGRESS NOTES
"Subjective   Patient ID: John Hicks is a 84 y.o. male who presents for Follow-up (FUV Bilat L4/5 TFESI on 1/17/25 he reports the pain is no better. Today he reports lower back pain L>R mostly on the lt side rates pain 3/10 sitting and 9/10 with walking any distance and  rolling over in bed and wakes him every 2 hours.  He is taking Tylenol and Aleve, he has no HEP, uses a quad cane.  He stopped taking gabapentin 300mg  because it did not help his pain. ) RAMA score  42%, COMM 0,   screenings: depression positive due to pain no plan to harm himself,  falls and smoking  negative.     Ashlee Gallagher RN 02/06/25 11:00 AM     Patient is an 84-year-old male following up today after undergoing a bilateral L4-5 transforaminal epidural steroid injection that unfortunate, did not  give patient much by way of relief.  He has a history of spine surgery in 1985.  He states that Dr. Cagle did a laminectomy with a fusion with a \"collarbone\".  Patient states that unfortunate, his symptoms then returned.  He has undergone a multitude of injections and a multitude of different pain management facilities without any significant relief.  He states that he had some at [facility] in the [facility].  At this time, he has lower back pain with some leg pain and leg numbness and coldness that is better with sitting sometimes and worse with walking and being upright.  He has a history of physical therapy home exercises but he states that a few times that he tried to do the home exercises handout with Bell's palsy and he wondered if it was related.  He states that the exercises also did not give him much by the way of relief.  He is taken over-the-counter medication without any long-term relief and he is here today to discuss other options once again.        Review of Systems   Constitutional: Negative.    HENT: Negative.     Eyes: Negative.    Respiratory: Negative.     Cardiovascular: Negative.    Gastrointestinal: Negative.  "   Endocrine: Negative.    Genitourinary: Negative.    Musculoskeletal:  Positive for arthralgias, back pain, gait problem and myalgias.   Skin: Negative.    Allergic/Immunologic: Negative.    Neurological:  Positive for weakness and numbness.   Hematological: Negative.    Psychiatric/Behavioral: Negative.         Objective   Physical Exam  Vitals and nursing note reviewed.   Constitutional:       General: He is not in acute distress.     Appearance: Normal appearance. He is not ill-appearing.   HENT:      Head: Normocephalic and atraumatic.      Right Ear: External ear normal.      Left Ear: External ear normal.      Nose: Nose normal.      Mouth/Throat:      Pharynx: Oropharynx is clear.   Eyes:      Conjunctiva/sclera: Conjunctivae normal.   Cardiovascular:      Rate and Rhythm: Normal rate and regular rhythm.      Pulses: Normal pulses.   Pulmonary:      Effort: Pulmonary effort is normal.      Breath sounds: Normal breath sounds.   Musculoskeletal:         General: Normal range of motion.      Cervical back: Normal range of motion.      Comments: 4+/5 strength in the LE   Some pain with compression of lumbar facet joints   Skin:     General: Skin is warm and dry.   Neurological:      General: No focal deficit present.      Mental Status: He is alert and oriented to person, place, and time. Mental status is at baseline.   Psychiatric:         Mood and Affect: Mood normal.         Behavior: Behavior normal.         Thought Content: Thought content normal.         Judgment: Judgment normal.         MR lumbar spine wo IV contrast  Status: Final result     PACS Images     Show images for MR lumbar spine wo IV contrast  Signed by    Signed Time Phone Pager   Stephen Campbell MD 12/05/2022 01:03 [phone]      Exam Information    Status Exam Begun Exam Ended   Final 12/02/2022 19:00 12/02/2022 19:30     Study Result    Narrative & Impression   MRN: [id number]  Patient Name: MIRIAM CHRISTENSEN     STUDY:  MRI L-SPINE  WO;  12/2/2022 7:30 pm     INDICATION:  pain  M48.062: Neurogenic claudication due to lumbar spinal stenosis  M47.817: Lumbosacral spondylosis.     COMPARISON:  None.     ACCESSION NUMBER(S):  [id number]     ORDERING CLINICIAN:  KATINA FERRARI     TECHNIQUE:  Sagittal T1, T2, STIR, axial T1 and T2 weighted images of the lumbar  spine were acquired.     FINDINGS:  There are 5 lumbar type non rib-bearing vertebral bodies, with the  lowest well-formed intervertebral disc space labeled L5-S1.     There are postsurgical changes consistent with posterior  decompression and laminectomies of L2 through L5.     There is a 1-2 mm retrolisthesis of L1 on L2, 3-4 mm retrolisthesis  of L2 on L3, 3-4 mm retrolisthesis of L3 on L4, and a 5-6 mm  anterolisthesis of L5 on S1.     There are multilevel insufficiency endplate changes, with mild  associated height loss present along the L3-L4 and L5-S1 endplates,  with minimal STIR hyperintense edema. There is no evidence of  compression fractures.     There is STIR hyperintense edema present along the L4-L5 facet joints  bilaterally, and within the adjacent paraspinal musculature. No  well-circumscribed fluid collections are identified.     There is multilevel intervertebral disc height loss, severe at L5-S1,  and mild to moderate at L2-L3 and L3-L4.     Visualized lower thoracic spinal cord terminates at L2 and is  unremarkable in appearance.     T12-L1: Disc osteophyte complex, eccentric to the right, slightly  deforms the anterior subarachnoid space, without significant spinal  canal stenosis. No significant neural foraminal narrowing is present.     L1-L2: Posterior disc bulge, eccentric to the right, slightly deforms  the subarachnoid space, without significant spinal canal stenosis,  and in combination with hypertrophic facet changes, causes  mild-to-moderate right-sided neural foraminal stenosis. No  significant left-sided neural foraminal stenosis is present.     L2-L3:  Combination of disc bulge, spondylolisthesis, and hypertrophic  facet changes deform the subarachnoid space, without significant  spinal canal stenosis. There is moderate bilateral neural foraminal  stenosis due to hypertrophic facet changes and para foraminal  osteophytic spurring.     L3-L4: Combination of disc osteophyte complex and hypertrophic facet  changes with ligamentum flavum thickening somewhat deform the  subarachnoid space without significant spinal canal stenosis.  Moderate to severe bilateral neural foraminal stenosis is present due  to hypertrophic facet changes and endplate spurring.     L4-L5: Hypertrophic facet changes and disc osteophyte spurring deform  the subarachnoid space and partially efface the subarticular recesses  bilaterally, without significant spinal canal stenosis. There is  severe bilateral neural foraminal narrowing due to endplate spurring  and hypertrophic facet changes.     L5-S1: Spondylolisthesis and endplate spurring slightly deform the  subarachnoid space, without significant spinal canal stenosis. There  is mild-to-moderate right-sided and moderate to severe right-sided  neural foraminal stenosis due to spondylolisthesis and hypertrophic  facet changes of endplate spurring.     Diffuse fatty atrophy of the paraspinal musculature is present. STIR  hyperintense signal within the paraspinal musculature overlying the  posterior elements of L4 through S1 is nonspecific.     IMPRESSION:  1.  Postsurgical changes suggestive of prior laminectomy and  posterior decompression of L2 through L5.  2. Multilevel degenerative changes of the cervical spine, with  without evidence of high-grade spinal canal stenosis, although there  are varying degrees of moderate to severe neural foraminal narrowing  present at several levels, as detailed above.  3. STIR hyperintense signal is present within the paraspinal  musculature overlying the lower lumbar spine, surrounding the L4-5  and L5-S1  facets, favored to be degenerative secondary to facet  osteoarthropathy, although other infectious and inflammatory  etiologies cannot be entirely excluded.     Assessment/Plan   Diagnoses and all orders for this visit:  Lumbar radiculopathy  -     MR lumbar spine wo IV contrast; Future  Neurogenic claudication due to lumbar spinal stenosis  -     MR lumbar spine wo IV contrast; Future  Chronic bilateral low back pain with bilateral sciatica  -     MR lumbar spine wo IV contrast; Future  Osteoarthritis of spine with radiculopathy, lumbosacral region  -     MR lumbar spine wo IV contrast; Future  Numbness  -     MR lumbar spine wo IV contrast; Future       Patient is an 84-year-old male with a past medical history significant for the above-mentioned medical diagnoses following up today after undergoing bilateral L4-5 transforaminal epidural steroid injection.  Unfortunate this did not give him much by the way of relief.  He still has lower back pain with bilateral buttock pain and leg heaviness as well as leg coldness and fatigue.  This affects his ambulatory status.  This affects his quality life.  This affects his activities and he is somewhat disenchanted with the injections as he states that he has undergone a multitude of injections at a multitude of different pain management offices without any significant relief.  At this time, we had a long stretch about his symptoms.  I would recommend obtaining updated lumbar MRI to discuss other potential options including not limited to hospital spinal cord stimulation versus other potential injection options.  He will follow-up after the MRI for reevaluation and discussion of options.  His last MRI was over 2 years ago and I feel at this time it is imperative to assess his anatomy before we discussed other Interventional options.

## 2025-02-07 ENCOUNTER — HOSPITAL ENCOUNTER (OUTPATIENT)
Dept: RADIOLOGY | Facility: HOSPITAL | Age: 85
Discharge: HOME | End: 2025-02-07
Payer: MEDICARE

## 2025-02-07 DIAGNOSIS — M54.16 LUMBAR RADICULOPATHY: ICD-10-CM

## 2025-02-07 DIAGNOSIS — M47.27 OSTEOARTHRITIS OF SPINE WITH RADICULOPATHY, LUMBOSACRAL REGION: ICD-10-CM

## 2025-02-07 DIAGNOSIS — M54.41 CHRONIC BILATERAL LOW BACK PAIN WITH BILATERAL SCIATICA: ICD-10-CM

## 2025-02-07 DIAGNOSIS — R20.0 NUMBNESS: ICD-10-CM

## 2025-02-07 DIAGNOSIS — M54.42 CHRONIC BILATERAL LOW BACK PAIN WITH BILATERAL SCIATICA: ICD-10-CM

## 2025-02-07 DIAGNOSIS — M48.062 NEUROGENIC CLAUDICATION DUE TO LUMBAR SPINAL STENOSIS: ICD-10-CM

## 2025-02-07 DIAGNOSIS — G89.29 CHRONIC BILATERAL LOW BACK PAIN WITH BILATERAL SCIATICA: ICD-10-CM

## 2025-02-07 PROCEDURE — 72148 MRI LUMBAR SPINE W/O DYE: CPT

## 2025-02-13 ENCOUNTER — APPOINTMENT (OUTPATIENT)
Dept: PAIN MEDICINE | Facility: CLINIC | Age: 85
End: 2025-02-13
Payer: MEDICARE

## 2025-02-27 ENCOUNTER — OFFICE VISIT (OUTPATIENT)
Dept: PAIN MEDICINE | Facility: CLINIC | Age: 85
End: 2025-02-27
Payer: MEDICARE

## 2025-02-27 VITALS
RESPIRATION RATE: 16 BRPM | BODY MASS INDEX: 42.09 KG/M2 | WEIGHT: 315 LBS | DIASTOLIC BLOOD PRESSURE: 81 MMHG | SYSTOLIC BLOOD PRESSURE: 182 MMHG | HEART RATE: 85 BPM

## 2025-02-27 DIAGNOSIS — M96.1 POSTLAMINECTOMY SYNDROME, LUMBAR REGION: ICD-10-CM

## 2025-02-27 DIAGNOSIS — Z98.1 S/P SPINAL FUSION: ICD-10-CM

## 2025-02-27 DIAGNOSIS — M54.16 LUMBAR RADICULOPATHY: Primary | ICD-10-CM

## 2025-02-27 DIAGNOSIS — M48.062 NEUROGENIC CLAUDICATION DUE TO LUMBAR SPINAL STENOSIS: ICD-10-CM

## 2025-02-27 PROCEDURE — 99214 OFFICE O/P EST MOD 30 MIN: CPT | Performed by: PHYSICIAN ASSISTANT

## 2025-02-27 RX ORDER — AMITRIPTYLINE HYDROCHLORIDE 10 MG/1
10 TABLET, FILM COATED ORAL NIGHTLY
Qty: 60 TABLET | Refills: 1 | Status: SHIPPED | OUTPATIENT
Start: 2025-02-27 | End: 2026-02-27

## 2025-02-27 ASSESSMENT — ENCOUNTER SYMPTOMS
HEMATOLOGIC/LYMPHATIC NEGATIVE: 1
BACK PAIN: 1
ALLERGIC/IMMUNOLOGIC NEGATIVE: 1
ENDOCRINE NEGATIVE: 1
WEAKNESS: 1
EYES NEGATIVE: 1
GASTROINTESTINAL NEGATIVE: 1
CARDIOVASCULAR NEGATIVE: 1
NUMBNESS: 1
MYALGIAS: 1
PSYCHIATRIC NEGATIVE: 1
CONSTITUTIONAL NEGATIVE: 1
ARTHRALGIAS: 1
RESPIRATORY NEGATIVE: 1

## 2025-02-27 NOTE — PROGRESS NOTES
Subjective   Patient ID: John Hicks is a 84 y.o. male who presents for Back Pain (FOLLOW UP ON LOWER BACK PAIN, WORSE ON THE LEFT THAN RIGHT SIDE, HE HAS MORE PAIN WITH WALKING, STANDING, TRANSITIONING, TWISTING TO THE LEFT, DESCRIBES TO BE SHARP PAIN, ). HE GETS SOME RELIEF WITH SITTING, HE TRIED HOME EXERCISES AND ISN'T ABLE TO MANEUVER, HE HAS TROUBLE EVEN ROLLING OVER IN BED HE WILL HAVE TO GET OUT OF BED AND SIT IN CHAIR FOR RELIEF, HE TAKES ALEVE PRN, HE AMBULATES WITH A QUAD CANE, PAIN SCORE SITTING 3/10, PAIN AT NIGHT IN BED 9/10, RAMA=47%, ETOH NO    Carolina Main, CMA 02/27/25 12:54 PM     Patient is an 84-year-old male following up today after undergoing an updated lumbar MRI scan.  Reports, he still has lower back pain with leg pain left side greater than right side.  Worse with standing, walking, being upright and active but he also has pain with laying in bed at night.  He rates the discomfort during the day at 3/10 but it can get up to a 9/10 depending on his activities.  The more he does the worse he gets and then when he lays down the worst it gets.  He states that he tried gabapentin in the past that did not help.  Recent transforaminal epidural steroid injection at the L4-5 level did not give him any significant long-term relief.  It did help but only short-term.  At this time, he is just not sure what he wants to do.  He does not want to be too aggressive and he states that he is not sure that it is even worth it as he is 84 years old but does wonder what he can do to try to get some relief.  He has taken over-the-counter medications that do not help.  He was given a home x-rays program but he was not able to do this because of the pain.        Review of Systems   Constitutional: Negative.    HENT: Negative.     Eyes: Negative.    Respiratory: Negative.     Cardiovascular: Negative.    Gastrointestinal: Negative.    Endocrine: Negative.    Genitourinary: Negative.    Musculoskeletal:  Positive  for arthralgias, back pain, gait problem and myalgias.   Skin: Negative.    Allergic/Immunologic: Negative.    Neurological:  Positive for weakness and numbness.   Hematological: Negative.    Psychiatric/Behavioral: Negative.         Objective   Physical Exam  Vitals and nursing note reviewed.   Constitutional:       General: He is not in acute distress.     Appearance: Normal appearance. He is not ill-appearing.   HENT:      Head: Normocephalic and atraumatic.      Right Ear: External ear normal.      Left Ear: External ear normal.      Nose: Nose normal.      Mouth/Throat:      Pharynx: Oropharynx is clear.   Eyes:      Conjunctiva/sclera: Conjunctivae normal.   Cardiovascular:      Rate and Rhythm: Normal rate and regular rhythm.      Pulses: Normal pulses.   Pulmonary:      Effort: Pulmonary effort is normal.      Breath sounds: Normal breath sounds.   Musculoskeletal:         General: Normal range of motion.      Cervical back: Normal range of motion.      Comments:  4+/5 strength in the LE   Some pain with compression of lumbar facet joints   Skin:     General: Skin is warm and dry.   Neurological:      General: No focal deficit present.      Mental Status: He is alert and oriented to person, place, and time. Mental status is at baseline.   Psychiatric:         Mood and Affect: Mood normal.         Behavior: Behavior normal.         Thought Content: Thought content normal.         Judgment: Judgment normal.       MR lumbar spine wo IV contrast  Status: Final result     PACS Images     Show images for MR lumbar spine wo IV contrast  Signed by    Signed Time Phone Pager   Brenden Blackmon MD 2/10/2025 06:55 [phone]     Exam Information    Status Exam Begun Exam Ended   Final 2/07/2025 13:16 2/07/2025 14:14     Study Result    Narrative & Impression   Interpreted By:  Brenden Blackmon,  and Don Peña   STUDY:  MR LUMBAR SPINE WO IV CONTRAST;  2/7/2025 2:14 pm       INDICATION:  Signs/Symptoms:lower back and leg pain and leg fatigue and coldness.      ,M54.16 Radiculopathy, lumbar region,M48.062 Spinal stenosis, lumbar  region with neurogenic claudication,M54.42 Lumbago with sciatica,  left side,M54.41 Lumbago with sciatica, right side,G89.29 Other  chronic pain,M47.27 Other spondylosis with radiculopathy, lumbosacral  region,R20.0 Anesthesia of skin      COMPARISON:  MR lumbar spine 12/02/2022.      ACCESSION NUMBER(S):  [id number]      ORDERING CLINICIAN:  CHRISTIN HUNT      TECHNIQUE:  Sagittal T1, T2, STIR, axial T1 and T2 weighted images of the lumbar  spine were acquired.      FINDINGS:  Segmentation: 5 lumbar-type vertebrae are noted.      Alignment: Grade 1 retrolisthesis of L1 on L2, L2 on L3, and L3 on L4  is noted. Grade 1 anterolisthesis of L5 on S1 is noted. Findings are  similar compared to 12/02/2022.      Vertebrae/Intervertebral Discs: Status post laminectomies of L2  through L5. The vertebral bodies demonstrate expected height.  Multilevel disc height loss and disc desiccation is noted, most  pronounced and severe at L5-S1 with associated type 2 Modic changes,  similar prior. Additional mild-to-moderate disc height loss and disc  desiccation is present at L2-L3 and L3-L4. mixed type 1 and type 2  Modic changes noted involving the endplates of at L4-L5. Surgical  hyperintensity is noted in the mid to posterior aspect of the L1-L2  intervertebral disc.      Conus medullaris: The lower thoracic cord appears unremarkable. The  conus medullaris terminates at L2.      T12-L1: Disc osteophyte complex minimally effaces the ventral thecal  sac. No spinal canal stenosis or neural foraminal narrowing.      L1-2: Disc osteophyte complex asymmetric to the right and facet  arthropathy. Minimal effacement of ventral thecal sac without  significant spinal canal stenosis. Moderate right neural foraminal  narrowing, similar to prior. No left neural foraminal narrowing.       L2-3: Status post laminectomy. Disc osteophyte complex and facet  arthropathy. No spinal canal stenosis. Moderate bilateral neural  foraminal narrowing, similar to prior.      L3-4: Status post laminectomy. Disc bulge and facet arthropathy. No  spinal canal stenosis. Moderate to severe bilateral neural foraminal  narrowing, similar to prior.      L4-5: Status post laminectomy. Disc bulge and facet arthropathy.  Interval development of a 5 mm x 10 mm herniated disc fragment  ventral to the thecal sac and extending into the left lateral recess  best appreciated on axial T2 weighted image 10/23 flattening and  deformity of the anterior contour of the thecal sac in the interval  since the previous exam. Marked narrowing of the left lateral recess  and neural foramen.      L5-S1: Status post laminectomy. Trace spondylolisthesis with pseudo  bulging intervertebral disc. No measurable canal stenosis. Severe  bilateral foraminal narrowing.      Diffuse fatty atrophy of the paraspinal musculature. There is similar  nonspecific STIR hyperintensity within the posterior spinal soft  tissues L4 through S1.      IMPRESSION:  1. Postsurgical changes from laminectomies of L2 through L5.  2. Interval progression of broad-based bulging or herniated nucleus  polyposis at L4/L5 to the left since the previous exam. Marked  narrowing of the left lateral recess and neural foramen  3. Spondylolisthesis with severe bilateral foraminal narrowing at  L5/S1 is unchanged compared to the previous exam  4. Severe foraminal narrowing at L3/L4 bilaterally is unchanged  compared to the previous exam.      I personally reviewed the images/study and I agree with the findings  as stated by Mariana Ochoa MD. This study was interpreted at  [facility], [place], [place].      MACRO:  None      Signed by: Brenden Blackmon 2/10/2025 6:55 AM  Dictation workstation:   [id number]     Assessment/Plan   Diagnoses and all  orders for this visit:  Lumbar radiculopathy  -     amitriptyline (Elavil) 10 mg tablet; Take 1 tablet (10 mg) by mouth once daily at bedtime. TAKE 1 TABLET at bedtime X 1 WEEK AND IF DOING WELL STAY THERE. IF TOLERATING BUT NO IMPROVEMENT GO TO 20 MG QHS  Postlaminectomy syndrome, lumbar region  -     amitriptyline (Elavil) 10 mg tablet; Take 1 tablet (10 mg) by mouth once daily at bedtime. TAKE 1 TABLET at bedtime X 1 WEEK AND IF DOING WELL STAY THERE. IF TOLERATING BUT NO IMPROVEMENT GO TO 20 MG QHS  S/P spinal fusion  -     amitriptyline (Elavil) 10 mg tablet; Take 1 tablet (10 mg) by mouth once daily at bedtime. TAKE 1 TABLET at bedtime X 1 WEEK AND IF DOING WELL STAY THERE. 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 TAKE 1 TABLET at bedtime X 1 WEEK AND IF DOING WELL STAY THERE. IF TOLERATING BUT NO IMPROVEMENT GO TO 20 MG QHS       Patient is an 84-year-old male with the above-mentioned medical diagnoses following up today after undergoing updated lumbar MRI scan.  We reviewed this.  We discussed different options.  We reviewed the pathology on his MRI and discussed referral to a surgeon.  At this time, he does not want to do this.  We discussed spinal cord stimulation options.  He does not want to do this.  We discussed potential injection options including but not limited to a caudal epidural steroid injection.  He is going to think about this.  He will call if he decides he wants to proceed with a caudal epidural steroid injection.  Procedure was discussed.  Risk and benefits were discussed.  If he calls to do this we will facilitate the authorization and the orders.  Otherwise, he is going to trial amitriptyline.  10 mg at bedtime x 1 week and if doing okay but needing more relief he can go to 20 mg at bedtime.  Potential side effects were  discussed.  If he decides to pursue the injection he will follow-up 2 weeks after.  Otherwise if he continues on the medication he will follow-up in 6 weeks.  Call back sooner if necessary.

## 2025-03-06 ENCOUNTER — APPOINTMENT (OUTPATIENT)
Dept: PRIMARY CARE | Facility: CLINIC | Age: 85
End: 2025-03-06
Payer: MEDICARE

## 2025-03-06 VITALS
WEIGHT: 315 LBS | HEART RATE: 84 BPM | DIASTOLIC BLOOD PRESSURE: 86 MMHG | SYSTOLIC BLOOD PRESSURE: 138 MMHG | BODY MASS INDEX: 46.65 KG/M2 | HEIGHT: 69 IN | OXYGEN SATURATION: 98 %

## 2025-03-06 DIAGNOSIS — E11.9 CONTROLLED TYPE 2 DIABETES MELLITUS WITHOUT COMPLICATION, WITHOUT LONG-TERM CURRENT USE OF INSULIN (MULTI): Chronic | ICD-10-CM

## 2025-03-06 DIAGNOSIS — Z12.5 PROSTATE CANCER SCREENING: ICD-10-CM

## 2025-03-06 DIAGNOSIS — M47.817 SPONDYLOSIS OF LUMBOSACRAL REGION WITHOUT MYELOPATHY OR RADICULOPATHY: Primary | ICD-10-CM

## 2025-03-06 PROCEDURE — 1159F MED LIST DOCD IN RCRD: CPT

## 2025-03-06 PROCEDURE — 1036F TOBACCO NON-USER: CPT

## 2025-03-06 PROCEDURE — 99214 OFFICE O/P EST MOD 30 MIN: CPT

## 2025-03-06 PROCEDURE — 1160F RVW MEDS BY RX/DR IN RCRD: CPT

## 2025-03-06 PROCEDURE — 1123F ACP DISCUSS/DSCN MKR DOCD: CPT

## 2025-03-06 PROCEDURE — 3075F SYST BP GE 130 - 139MM HG: CPT

## 2025-03-06 PROCEDURE — 3079F DIAST BP 80-89 MM HG: CPT

## 2025-03-06 ASSESSMENT — ENCOUNTER SYMPTOMS
TROUBLE SWALLOWING: 0
UNEXPECTED WEIGHT CHANGE: 0
ABDOMINAL PAIN: 0
PALPITATIONS: 0
WOUND: 0
FREQUENCY: 0
NUMBNESS: 0
RECTAL PAIN: 0
HEADACHES: 0
ARTHRALGIAS: 1
POLYPHAGIA: 0
POLYDIPSIA: 0
BACK PAIN: 1
MYALGIAS: 1
DIAPHORESIS: 0
DIARRHEA: 0
CONSTIPATION: 0
FATIGUE: 0
SHORTNESS OF BREATH: 0
CHILLS: 0
NAUSEA: 0
CHEST TIGHTNESS: 0
NERVOUS/ANXIOUS: 0
DIFFICULTY URINATING: 0
WEAKNESS: 0

## 2025-03-06 ASSESSMENT — PATIENT HEALTH QUESTIONNAIRE - PHQ9
SUM OF ALL RESPONSES TO PHQ9 QUESTIONS 1 AND 2: 0
2. FEELING DOWN, DEPRESSED OR HOPELESS: NOT AT ALL
1. LITTLE INTEREST OR PLEASURE IN DOING THINGS: NOT AT ALL

## 2025-03-06 NOTE — PROGRESS NOTES
Subjective   Patient ID: John Hicks is a 84 y.o. male who presents for Establish Care (Back pain, problems with sugar).    Patient presents today to establish primary care, evaluation of chronic back pain.    Chronic back pain: Seen by 's office.  Has had injections and used amitriptyline recently for pain relief.  Still has reduced range of motion in lower extremities.  He has tried PT in the past with minimal success.  We did discuss his approach to PT and he stated that he only performed the exercises at the facility and not at home and did not continue any therapeutics at home.  We did discuss possible options for treatment of his back including medication, surgery and extended PT.  At this time he feels he would like to start physical therapy program for the next 6 to 8 weeks with dedicated home exercise to see if he can increase his strength and range of motion.  After that time he will contact us for reevaluation and possible different options.    Type 2 diabetes: Last A1c 7.1, 7 months ago.  He has had no other baseline labs drawn.  Will order baseline lab panels today.    No other acute or chronic complaints at this time.           Review of Systems   Constitutional:  Negative for chills, diaphoresis, fatigue and unexpected weight change.   HENT:  Negative for dental problem, tinnitus and trouble swallowing.    Eyes:  Negative for visual disturbance.   Respiratory:  Negative for chest tightness and shortness of breath.    Cardiovascular:  Negative for chest pain, palpitations and leg swelling.   Gastrointestinal:  Negative for abdominal pain, constipation, diarrhea, nausea and rectal pain.   Endocrine: Negative for polydipsia, polyphagia and polyuria.   Genitourinary:  Negative for difficulty urinating, frequency and urgency.   Musculoskeletal:  Positive for arthralgias, back pain and myalgias.   Skin:  Negative for pallor and wound.   Neurological:  Negative for syncope, weakness, numbness and  "headaches.   Psychiatric/Behavioral:  Negative for suicidal ideas. The patient is not nervous/anxious.        Objective   /86 (BP Location: Left arm, Patient Position: Sitting)   Pulse 84   Ht 1.759 m (5' 9.25\")   Wt 144 kg (318 lb)   SpO2 98%   BMI 46.62 kg/m²     Physical Exam  Vitals and nursing note reviewed.   Constitutional:       General: He is not in acute distress.     Appearance: Normal appearance.   HENT:      Head: Normocephalic.      Nose: Nose normal.      Mouth/Throat:      Mouth: Mucous membranes are moist.      Pharynx: Oropharynx is clear.   Eyes:      General: No scleral icterus.     Pupils: Pupils are equal, round, and reactive to light.   Neck:      Vascular: No carotid bruit.   Cardiovascular:      Rate and Rhythm: Normal rate and regular rhythm.      Pulses: Normal pulses.      Heart sounds: Normal heart sounds. No murmur heard.  Pulmonary:      Effort: Pulmonary effort is normal. No respiratory distress.      Breath sounds: Normal breath sounds. No stridor. No wheezing, rhonchi or rales.   Abdominal:      General: Bowel sounds are normal. There is no distension.      Palpations: Abdomen is soft.      Tenderness: There is no abdominal tenderness. There is no right CVA tenderness or left CVA tenderness.   Musculoskeletal:         General: Tenderness (Lower back) present. No swelling. Normal range of motion.      Cervical back: Normal range of motion.      Right lower leg: No edema.      Left lower leg: No edema.   Skin:     General: Skin is warm and dry.      Capillary Refill: Capillary refill takes less than 2 seconds.   Neurological:      General: No focal deficit present.      Mental Status: He is alert and oriented to person, place, and time. Mental status is at baseline.      Motor: Weakness (Bilateral legs with extension and flexion against pressure) present.   Psychiatric:         Mood and Affect: Mood normal.         Behavior: Behavior normal.         Thought Content: Thought " content normal.         Judgment: Judgment normal.         Assessment/Plan   Diagnoses and all orders for this visit:  Spondylosis of lumbosacral region without myelopathy or radiculopathy  -     Referral to Physical Therapy; Future  Controlled type 2 diabetes mellitus without complication, without long-term current use of insulin (Multi)  -     Basic Metabolic Panel; Future  -     Hemoglobin A1C; Future  Prostate cancer screening  -     Prostate Specific Antigen; Future

## 2025-03-07 LAB
ANION GAP SERPL CALCULATED.4IONS-SCNC: 10 MMOL/L (CALC) (ref 7–17)
BUN SERPL-MCNC: 14 MG/DL (ref 7–25)
BUN/CREAT SERPL: 9 (CALC) (ref 6–22)
CALCIUM SERPL-MCNC: 9.5 MG/DL (ref 8.6–10.3)
CHLORIDE SERPL-SCNC: 104 MMOL/L (ref 98–110)
CO2 SERPL-SCNC: 25 MMOL/L (ref 20–32)
CREAT SERPL-MCNC: 1.52 MG/DL (ref 0.7–1.22)
EGFRCR SERPLBLD CKD-EPI 2021: 45 ML/MIN/1.73M2
EST. AVERAGE GLUCOSE BLD GHB EST-MCNC: 192 MG/DL
EST. AVERAGE GLUCOSE BLD GHB EST-SCNC: 10.6 MMOL/L
GLUCOSE SERPL-MCNC: 126 MG/DL (ref 65–139)
HBA1C MFR BLD: 8.3 % OF TOTAL HGB
POTASSIUM SERPL-SCNC: 4.6 MMOL/L (ref 3.5–5.3)
PSA SERPL-MCNC: 0.54 NG/ML
SODIUM SERPL-SCNC: 139 MMOL/L (ref 135–146)

## 2025-03-11 ENCOUNTER — OFFICE VISIT (OUTPATIENT)
Dept: PAIN MEDICINE | Facility: CLINIC | Age: 85
End: 2025-03-11
Payer: MEDICARE

## 2025-03-11 DIAGNOSIS — M96.1 POSTLAMINECTOMY SYNDROME, LUMBAR REGION: ICD-10-CM

## 2025-03-11 DIAGNOSIS — M54.42 CHRONIC BILATERAL LOW BACK PAIN WITH BILATERAL SCIATICA: ICD-10-CM

## 2025-03-11 DIAGNOSIS — M54.41 CHRONIC BILATERAL LOW BACK PAIN WITH BILATERAL SCIATICA: ICD-10-CM

## 2025-03-11 DIAGNOSIS — G89.29 CHRONIC BILATERAL LOW BACK PAIN WITH BILATERAL SCIATICA: ICD-10-CM

## 2025-03-11 DIAGNOSIS — M48.062 NEUROGENIC CLAUDICATION DUE TO LUMBAR SPINAL STENOSIS: Primary | ICD-10-CM

## 2025-03-11 PROCEDURE — 99213 OFFICE O/P EST LOW 20 MIN: CPT

## 2025-03-11 ASSESSMENT — COLUMBIA-SUICIDE SEVERITY RATING SCALE - C-SSRS
6. HAVE YOU EVER DONE ANYTHING, STARTED TO DO ANYTHING, OR PREPARED TO DO ANYTHING TO END YOUR LIFE?: NO
1. IN THE PAST MONTH, HAVE YOU WISHED YOU WERE DEAD OR WISHED YOU COULD GO TO SLEEP AND NOT WAKE UP?: NO
2. HAVE YOU ACTUALLY HAD ANY THOUGHTS OF KILLING YOURSELF?: NO

## 2025-03-11 ASSESSMENT — ENCOUNTER SYMPTOMS
DYSPHORIC MOOD: 0
FACIAL ASYMMETRY: 0
ARTHRALGIAS: 0
BACK PAIN: 1
CONSTITUTIONAL NEGATIVE: 1
MYALGIAS: 1
ADENOPATHY: 0
ALLERGIC/IMMUNOLOGIC NEGATIVE: 1
BRUISES/BLEEDS EASILY: 0
PALPITATIONS: 0
WHEEZING: 0
SHORTNESS OF BREATH: 0
ENDOCRINE NEGATIVE: 1
EYES NEGATIVE: 1
LIGHT-HEADEDNESS: 0
WEAKNESS: 0
COUGH: 0

## 2025-03-11 NOTE — PROGRESS NOTES
Subjective   Patient ID: John Hicks is a 84 y.o. male who presents for Follow-up (FU meds he report the Elavil did nothing for his pain and made him feel off balance he stopped taking them. Today he reports having pain in  Today he reports lower back pain L>R mostly on the lt side rates pain 2/10 sitting and 4-5/10 with walking any distance and  rolling over in bed and wakes him every 2 hours.  He is not  taking anything for his pain as it does not help,   he has no HEP, uses a quad cane.) RAMA score  56%.   Ashlee Gallagher RN 03/11/25 1:20 PM     The patient is an 84-year-old male who presents today for a 6-week follow-up appointment for medication management.  He currently rates his pain a 2/10, says it does get up to a 5/10 at its worst across his lower back the left side is worse than the right.  At his last appointment he was started on amitriptyline 10 mg nightly, but after 3 doses he said he did not like the way it made him feel, and he sometimes stopped taking it.  In general, he says he tends to be more sensitive to medications, and is hesitant about trying any other medications.  He is also not interested in injections at this time, saying the ones he has had in the past have not been helpful.  He is wondering what other options he has to help with his pain.  He did mention that his PCP recently referred him for PT, and he is set to start that soon.         Review of Systems   Constitutional: Negative.    HENT: Negative.     Eyes: Negative.    Respiratory:  Negative for cough, shortness of breath and wheezing.    Cardiovascular:  Negative for chest pain, palpitations and leg swelling.   Endocrine: Negative.    Genitourinary: Negative.    Musculoskeletal:  Positive for back pain and myalgias. Negative for arthralgias.   Skin: Negative.    Allergic/Immunologic: Negative.    Neurological:  Negative for facial asymmetry, weakness and light-headedness.   Hematological:  Negative for adenopathy. Does not  bruise/bleed easily.   Psychiatric/Behavioral:  Negative for dysphoric mood and suicidal ideas.        Objective   Physical Exam  Constitutional:       General: He is not in acute distress.     Appearance: Normal appearance.   HENT:      Head: Normocephalic.      Mouth/Throat:      Mouth: Mucous membranes are moist.   Eyes:      Extraocular Movements: Extraocular movements intact.   Cardiovascular:      Rate and Rhythm: Normal rate and regular rhythm.      Pulses: Normal pulses.      Heart sounds: Normal heart sounds. No murmur heard.     No friction rub. No gallop.   Pulmonary:      Effort: Pulmonary effort is normal.      Breath sounds: Normal breath sounds. No wheezing, rhonchi or rales.   Abdominal:      General: Abdomen is flat.      Palpations: Abdomen is soft.   Musculoskeletal:      Cervical back: Normal range of motion.      Right lower leg: No edema.      Left lower leg: No edema.      Comments: Ambulates with a cane  Strength 5/5 BLE   Lymphadenopathy:      Cervical: No cervical adenopathy.   Skin:     General: Skin is warm and dry.   Neurological:      General: No focal deficit present.      Mental Status: He is alert and oriented to person, place, and time. Mental status is at baseline.   Psychiatric:         Mood and Affect: Mood normal.         Behavior: Behavior normal.         Assessment/Plan   Diagnoses and all orders for this visit:  Neurogenic claudication due to lumbar spinal stenosis  Chronic bilateral low back pain with bilateral sciatica  Postlaminectomy syndrome, lumbar region       The patient is an 84-year-old male with a past medical history significant for the above-mentioned problems.  We discussed different medication management options in depth, but as he seems to be very sensitive to medication side effects, we have decided to try a compound cream to see if this can bring him any pain relief without the systemic side effects.  He is wanting to give this a try and try to be physical  therapy his PCP ordered.  He does not have any further questions or concerns about his pain at this time.  He would like to follow-up in 3 to 4 months, call the clinic sooner if needed.

## 2025-03-17 ENCOUNTER — EVALUATION (OUTPATIENT)
Dept: PHYSICAL THERAPY | Facility: CLINIC | Age: 85
End: 2025-03-17
Payer: MEDICARE

## 2025-03-17 DIAGNOSIS — M47.817 SPONDYLOSIS OF LUMBOSACRAL REGION WITHOUT MYELOPATHY OR RADICULOPATHY: ICD-10-CM

## 2025-03-17 DIAGNOSIS — R26.89 DECREASED FUNCTIONAL MOBILITY: Primary | ICD-10-CM

## 2025-03-17 PROCEDURE — 97161 PT EVAL LOW COMPLEX 20 MIN: CPT | Mod: GP

## 2025-03-17 PROCEDURE — 97110 THERAPEUTIC EXERCISES: CPT | Mod: GP

## 2025-03-17 ASSESSMENT — ENCOUNTER SYMPTOMS
OCCASIONAL FEELINGS OF UNSTEADINESS: 1
LOSS OF SENSATION IN FEET: 1
DEPRESSION: 0

## 2025-03-17 ASSESSMENT — PAIN SCALES - GENERAL: PAINLEVEL_OUTOF10: 2

## 2025-03-17 ASSESSMENT — PAIN - FUNCTIONAL ASSESSMENT: PAIN_FUNCTIONAL_ASSESSMENT: 0-10

## 2025-03-17 NOTE — PROGRESS NOTES
Physical Therapy    Physical Therapy Evaluation    Patient Name: John Hicks  MRN: [id number]  : 1940  Referring Physician: Sammy Valdivia  Today's Date: 3/17/2025  Time Calculation  Start Time: 1345  Stop Time: 1427  Time Calculation (min): 42 min  PT Evaluation Time Entry  PT Evaluation (Low) Time Entry: 31  PT Therapeutic Procedures Time Entry  Therapeutic Exercise Time Entry: 11           General  Reason for Referral: LBP with stenosis  Referred By: Sammy Valdivia  General Comment: Visit  POC    Current Problem  Problem List Items Addressed This Visit             ICD-10-CM       Neuro    Lumbosacral spondylosis M47.817    Relevant Orders    Follow Up In Physical Therapy       Symptoms and Signs    Decreased functional mobility - Primary R26.89    Relevant Orders    Follow Up In Physical Therapy          SUBJECTIVE  Subjective   Patient reports LBP with sx going into BL legs that began years ago . This is leading to difficulty with Walking, Standing, Bending, Lifting, Twisting, Squatting, and household chores .  Pain is reported to range 2-6/10 with daily activities.  Patient states that their goal is to decrease pain with physical therapy intervention.    Prior level of function: WNL    Patient's name and  were confirmed this date.    MRI shows 1. Postsurgical changes from laminectomies of L2 through L5. 2. Interval progression of broad-based bulging or herniated nucleus polyposis at L4/L5 to the left since the previous exam. Marked narrowing of the left lateral recess and neural foramen 3. Spondylolisthesis with severe bilateral foraminal narrowing at L5/S1 is unchanged compared to the previous exam 4. Severe foraminal narrowing at L3/L4 bilaterally is unchanged compared to the previous exam. Pt reports long term back pain L>R that goes all the way down his legs RAYSHAWN. Pt reports difficulty with walking, standing, bending, lifting. Pt has had cortisone shots without any long term effect. Pt says that  he uses a SPC when out of house but uses furniture walking when at home, which is 1 floor.  Pt wants to be able to walk out to his barn and do wood working. Pt with n+t in RAYSHAWN feet.     Precautions  Precautions  STEADI Fall Risk Score (The score of 4 or more indicates an increased risk of falling): 6  Precautions Comment: Fall risk, DM, HTN, Prior lumbar laminectomies       Pain  Pain Assessment: 0-10  0-10 (Numeric) Pain Score: 2 (At worst 6)  Pain Type: Chronic pain  Pain Location: Back  Pain Orientation: Right, Left  Pain Radiating Towards: RAYSHAWN LE  Pain Descriptors: Aching, Sore, Sharp, Shooting, Pins and needles, Numbness, Tightness  Pain Frequency: Constant/continuous    Barriers to Participation:None    OBJECTIVE:    Lower Extremity Strength:  LE strength not listed below is WNL  MMT 5/5 max  LEFT RIGHT   Hip Flexion 4, P! 4, P!   Hip Extension     Hip Abduction Tested in sitting 4 4   Hip Adduction 4 4   Knee Extension 5 5   Knee Flexion 4+ 4+   Ankle DF 4+ 4+   Ankle PF     Ankle INV     Ankle EV       Lumbar  AROM:   Flexion: Mod limitation, P!  Extension: Major limitation, P!  R side bend: Mod limitation, P!  L side bend: Mod limitation, P!    Muscle Flexibility: Mod HS limitation RAYSHAWN    Posture: Flexed posture, forward head, rounded shoulders    Joint mobility: Nt    Gait mechanics: Flexed posture, decreased stride length, decreased terminal hip ext, lateral lean during stance     Palpation: TTP along RAYSHAWN SIJ, increased tissue tension along RAYSHAWN lumbar paraspinals L>R, glutes, piriformis     Special tests:   Lumbar spring test: Nt   Repeated flexion: Nt   Repeated extension: Nt   Straight Leg Raise: NT              Slump Test: R and L pulling sensation              Quadrant test: Nt    Other Measures  Oswestry Disablity Index (RAMA): 27       TREATMENT:     Seated marching x10  Seated LAQ x10  Seated Hip ABD with mint band x10    PATIENT EDUCATION:  Access Code: [email or web address]  URL:  [email or web address]  Date: 03/17/2025  Prepared by: Albert Cobb    Exercises  - Seated March  - 1 x daily - 7 x weekly - 3 sets - 10 reps  - Seated Long Arc Quad  - 1 x daily - 7 x weekly - 3 sets - 10 reps  - Seated Hip Abduction with Resistance  - 1 x daily - 7 x weekly - 3 sets - 10 reps    ASSESSEMENT  Pt is a 84 y.o.  referred for physical therapy by Sammy Valdivia APRN-C*  for LBP with signs and symptoms consistent with spondylosis and stenosis. Pt presents with Pain, Pain with Activity, Decreased ROM, Decreased Strength, Increased Tissue tension, Increased Tissue tenderness, Decreased functional mobility , and Gait abnormalities  that is affecting Walking, Standing, Bending, Lifting, Twisting, Squatting, and household chores . This patient would benefit from a therapy program to restore prior level of function, decrease pain, increase AROM, increase strength and improve posture. Pt tolerated all treatment at this time.     Rehab potential: Fair    Plan for next visit: Review HEP, LE strengthening, Core strengthening, lumbar mobility, Hip mobility, gait, balance     PLAN  Treatment/Interventions: Aquatic therapy, Biofeedback, Blood flow restriction therapy, Cryotherapy, Dry needling, Electrical stimulation, Education/ Instruction, Gait training, Hot pack, Laser, Manual therapy, Mechanical traction, Neuromuscular re-education, Taping techniques, Therapeutic activities, Therapeutic exercises, Ultrasound, Vasopneumatic device  PT Plan: Skilled PT  PT Frequency: 1 time per week  Duration: 6 weeks or 6 visits  Onset Date: 04/17/23  Certification Period Start Date: 03/17/25  Certification Period End Date: 06/15/25  Rehab Potential: Fair  Plan of Care Agreement: Patient    Pt. Is in agreement with PT plan.  Goals:  Active       PT Problem       PT Low back Goals       Start:  03/17/25    Expected End:  05/16/25       1. Pt will adhere to and complete HEP in order to improve functionality  outside of the clinic. (2 weeks)    2.   Pt will report 3/10 pain when performing Walking, Standing, Bending, Lifting, Twisting, Squatting, and household chores in order to improve quality of life, functional mobility, and maintain functional independence. (5-6 weeks)    3.   Pt will improve postural awareness in order to reduce reoccurrence of impairments. (2-3 weeks)    4.   Pt will increase strength of BLE to  5/5 in order to improve Walking, Standing, Bending, Lifting, Twisting, Squatting, and household chores.     5.   Pt will ambulate with  least restrictive assistive device with step through pattern  to decrease fall  risk and promote functional mobility. (4-5 weeks)    6.  Pt goal: Reduce pain     7. Pt will improve OWI by 7 points (MCID) to show reduction in disability and improvement in functionality. (5-6 weeks)

## 2025-03-19 ENCOUNTER — APPOINTMENT (OUTPATIENT)
Dept: UROLOGY | Facility: CLINIC | Age: 85
End: 2025-03-19
Payer: MEDICARE

## 2025-03-19 DIAGNOSIS — E11.9 CONTROLLED TYPE 2 DIABETES MELLITUS WITHOUT COMPLICATION, WITHOUT LONG-TERM CURRENT USE OF INSULIN (MULTI): Primary | ICD-10-CM

## 2025-03-19 DIAGNOSIS — N18.31 STAGE 3A CHRONIC KIDNEY DISEASE (MULTI): ICD-10-CM

## 2025-03-19 RX ORDER — GLIPIZIDE 10 MG/1
10 TABLET ORAL
Qty: 180 TABLET | Refills: 1 | Status: SHIPPED | OUTPATIENT
Start: 2025-03-19 | End: 2025-09-15

## 2025-03-19 NOTE — PROGRESS NOTES
Contacted patient discussed lab results.  His A1c is now 8.3.  Will increase glipizide from 5 mg to 10 mg daily.  Recheck labs in 3 months with new A1c and albumin/creatinine ratio.  He also has stage III chronic kidney disease, he previously had not been diagnosed as such.  Did discuss with him future monitoring and health measures for kidney preservation.

## 2025-03-26 ENCOUNTER — TREATMENT (OUTPATIENT)
Dept: PHYSICAL THERAPY | Facility: CLINIC | Age: 85
End: 2025-03-26
Payer: MEDICARE

## 2025-03-26 DIAGNOSIS — R26.89 DECREASED FUNCTIONAL MOBILITY: Primary | ICD-10-CM

## 2025-03-26 DIAGNOSIS — M47.817 SPONDYLOSIS OF LUMBOSACRAL REGION WITHOUT MYELOPATHY OR RADICULOPATHY: ICD-10-CM

## 2025-03-26 PROCEDURE — 97110 THERAPEUTIC EXERCISES: CPT | Mod: GP

## 2025-03-26 ASSESSMENT — PAIN - FUNCTIONAL ASSESSMENT: PAIN_FUNCTIONAL_ASSESSMENT: 0-10

## 2025-03-26 ASSESSMENT — PAIN SCALES - GENERAL: PAINLEVEL_OUTOF10: 2

## 2025-03-26 NOTE — PROGRESS NOTES
"Physical Therapy Treatment    Patient Name: John Hicks  MRN: [id number]  : 1940   Sammy Valdivia  Today's Date: 3/26/2025  Time Calculation  Start Time: 1041  Stop Time: 1123  Time Calculation (min): 42 min  PT Therapeutic Procedures Time Entry  Therapeutic Exercise Time Entry: 39           General  Reason for Referral: LBP with stenosis  Referred By: Sammy Valdivia  General Comment: Visit  POC  Visit #2     Current Problem  Problem List Items Addressed This Visit             ICD-10-CM       Neuro    Lumbosacral spondylosis M47.817       Symptoms and Signs    Decreased functional mobility - Primary R26.89            Subjective   Pt reports he feels like his back is feeling slightly better but is still having pain that he would currently rate at 2/10. Pt  has been able to complete HEP without any issues at this time.      Pt. Reports compliance with HEP.    Precautions  Precautions  Precautions Comment: Fall risk, DM, HTN, Prior lumbar laminectomies    Pain  Pain Assessment: 0-10  0-10 (Numeric) Pain Score: 2  Pain Type: Chronic pain  Pain Location: Back  Pain Orientation: Right, Left    Objective      Pt ambulates with quad cane, flexed posture, and antalgic on R         Treatments:    Therapeutic exercise  Stepper x5' (HEP and Tissue extensibility)  Ball rollouts fwd/lat 1.5' each   Seated HS stretch 2x30\"  Seated marching 2x15  Seated LAQ 2x15  Seated Hip ABD with mint band 2x15  Seated Hip ADD isometric with ball 3\"hold 2x10  Seated Heel and toe raises 2x10      Plan for next visit: Lumbar mobility, LE strengthening, postural strengthening, hip mobility, standing exercises     Current HEP:   Access Code: [email or web address]  URL: [email or web address]  Date: 2025  Prepared by: Albert Cobb    Exercises  - Seated March  - 1 x daily - 7 x weekly - 3 sets - 10 reps  - Seated Long Arc Quad  - 1 x daily - 7 x weekly - 3 sets - 10 reps  - Seated Hip Abduction with Resistance  - 1 x daily - 7 " x weekly - 3 sets - 10 reps  - Seated Hip Adduction Isometrics with Ball  - 1 x daily - 7 x weekly - 3 sets - 10 reps - 3s hold  - Seated Heel Toe Raises  - 1 x daily - 7 x weekly - 3 sets - 10 reps    Assessment:     Pt tolerated session well with just some reported muscle soreness post session. Pt HEP updated to include progressed strengthening exercises. Pt will benefit from continued treatment.     Plan:  OP PT Plan  Treatment/Interventions: Aquatic therapy, Biofeedback, Blood flow restriction therapy, Cryotherapy, Dry needling, Electrical stimulation, Education/ Instruction, Gait training, Hot pack, Laser, Manual therapy, Mechanical traction, Neuromuscular re-education, Taping techniques, Therapeutic activities, Therapeutic exercises, Ultrasound, Vasopneumatic device  PT Plan: Skilled PT  PT Frequency: 1 time per week  Duration: 6 weeks or 6 visits  Onset Date: 04/17/23  Certification Period Start Date: 03/17/25  Certification Period End Date: 06/15/25  Rehab Potential: Fair  Plan of Care Agreement: Patient    Goals:  Active       PT Problem       PT Low back Goals       Start:  03/17/25    Expected End:  05/16/25       1. Pt will adhere to and complete HEP in order to improve functionality outside of the clinic. (2 weeks)    2.   Pt will report 3/10 pain when performing Walking, Standing, Bending, Lifting, Twisting, Squatting, and household chores in order to improve quality of life, functional mobility, and maintain functional independence. (5-6 weeks)    3.   Pt will improve postural awareness in order to reduce reoccurrence of impairments. (2-3 weeks)    4.   Pt will increase strength of BLE to  5/5 in order to improve Walking, Standing, Bending, Lifting, Twisting, Squatting, and household chores.     5.   Pt will ambulate with  least restrictive assistive device with step through pattern  to decrease fall  risk and promote functional mobility. (4-5 weeks)    6.  Pt goal: Reduce pain     7. Pt will improve  OWI by 7 points (MCID) to show reduction in disability and improvement in functionality. (5-6 weeks)

## 2025-04-02 ENCOUNTER — APPOINTMENT (OUTPATIENT)
Dept: UROLOGY | Facility: CLINIC | Age: 85
End: 2025-04-02
Payer: MEDICARE

## 2025-04-02 ENCOUNTER — TREATMENT (OUTPATIENT)
Dept: PHYSICAL THERAPY | Facility: CLINIC | Age: 85
End: 2025-04-02
Payer: MEDICARE

## 2025-04-02 DIAGNOSIS — M47.817 SPONDYLOSIS OF LUMBOSACRAL REGION WITHOUT MYELOPATHY OR RADICULOPATHY: ICD-10-CM

## 2025-04-02 DIAGNOSIS — R26.89 DECREASED FUNCTIONAL MOBILITY: Primary | ICD-10-CM

## 2025-04-02 PROCEDURE — 97110 THERAPEUTIC EXERCISES: CPT | Mod: GP

## 2025-04-02 ASSESSMENT — PAIN SCALES - GENERAL: PAINLEVEL_OUTOF10: 2

## 2025-04-02 ASSESSMENT — PAIN - FUNCTIONAL ASSESSMENT: PAIN_FUNCTIONAL_ASSESSMENT: 0-10

## 2025-04-02 NOTE — PROGRESS NOTES
"Physical Therapy Treatment    Patient Name: John Hicks  MRN: [id number]  : 1940   Sammy Valdivia  Today's Date: 2025  Time Calculation  Start Time:   Stop Time: 112  Time Calculation (min): 43 min  PT Therapeutic Procedures Time Entry  Therapeutic Exercise Time Entry: 40           General  Reason for Referral: LBP with stenosis  Referred By: Sammy Valdivia  General Comment: Visit 3/7 POC  Visit #3     Current Problem  Problem List Items Addressed This Visit             ICD-10-CM       Neuro    Lumbosacral spondylosis M47.817       Symptoms and Signs    Decreased functional mobility - Primary R26.89            Subjective   Pt reports that his back pain is feeling pretty similar to last time and would rate pain at 2/10. Pt says that he feels he is able to cross his legs a little bit easier today compared to the last time he was in. Pt has been able to complete HEP without any issues.      Pt. Reports compliance with HEP.    Precautions  Precautions  Precautions Comment: Fall risk, DM, HTN, Prior lumbar laminectomies    Pain  Pain Assessment: 0-10  0-10 (Numeric) Pain Score: 2  Pain Type: Chronic pain  Pain Location: Back  Pain Orientation: Right, Left    Objective      Pt ambulates with antalgic gait on R and flexed posture.     Last Session:  Stepper x5' (HEP and Tissue extensibility)  Ball rollouts fwd/lat 1.5' each   Seated HS stretch 2x30\"  Seated marching 2x15  Seated LAQ 2x15  Seated Hip ABD with mint band 2x15  Seated Hip ADD isometric with ball 3\"hold 2x10  Seated Heel and toe raises 2x10  Treatments:    Therapeutic exercise  Stepper x5' (HEP and Tissue extensibility)  Slantboard calf stretch 2x1'   Ball rollouts Fwd/lat 1.5' each   Seated HS stretch 2x30\"  Standing marching R/L 2x10  Standing Hip ABD R/L 2x10  Standing Hip Ext  R/L 2x10  Standing Knee flexion R/L 2x10  Standing heel/toe raises 2x10  Seated mid rows with mint band 2x10  Seated Hip ADD isometric with ball 3\" hold 2x10    Plan " for next visit: LE strengthening, Hip strengthening, Core strengthening, Postural strengthening, Functional mobility     Current HEP:   Access Code: [email or web address]  URL: [email or web address]  Date: 03/26/2025  Prepared by: Albert Cobb    Exercises  - Seated March  - 1 x daily - 7 x weekly - 3 sets - 10 reps  - Seated Long Arc Quad  - 1 x daily - 7 x weekly - 3 sets - 10 reps  - Seated Hip Abduction with Resistance  - 1 x daily - 7 x weekly - 3 sets - 10 reps  - Seated Hip Adduction Isometrics with Ball  - 1 x daily - 7 x weekly - 3 sets - 10 reps - 3s hold  - Seated Heel Toe Raises  - 1 x daily - 7 x weekly - 3 sets - 10 reps    Assessment:     Pt tolerated session well with multiple rest breaks throughout session but no reported increase in pain. Pt with decreased muscle control and overall endurance with session. Pt will benefit from continued treatment.     Plan:  OP PT Plan  Treatment/Interventions: Aquatic therapy, Biofeedback, Blood flow restriction therapy, Cryotherapy, Dry needling, Electrical stimulation, Education/ Instruction, Gait training, Hot pack, Laser, Manual therapy, Mechanical traction, Neuromuscular re-education, Taping techniques, Therapeutic activities, Therapeutic exercises, Ultrasound, Vasopneumatic device  PT Plan: Skilled PT  PT Frequency: 1 time per week  Duration: 6 weeks or 6 visits  Onset Date: 04/17/23  Certification Period Start Date: 03/17/25  Certification Period End Date: 06/15/25  Rehab Potential: Fair  Plan of Care Agreement: Patient    Goals:  Active       PT Problem       PT Low back Goals       Start:  03/17/25    Expected End:  05/16/25       1. Pt will adhere to and complete HEP in order to improve functionality outside of the clinic. (2 weeks)    2.   Pt will report 3/10 pain when performing Walking, Standing, Bending, Lifting, Twisting, Squatting, and household chores in order to improve quality of life, functional mobility, and maintain functional  independence. (5-6 weeks)    3.   Pt will improve postural awareness in order to reduce reoccurrence of impairments. (2-3 weeks)    4.   Pt will increase strength of BLE to  5/5 in order to improve Walking, Standing, Bending, Lifting, Twisting, Squatting, and household chores.     5.   Pt will ambulate with  least restrictive assistive device with step through pattern  to decrease fall  risk and promote functional mobility. (4-5 weeks)    6.  Pt goal: Reduce pain     7. Pt will improve OWI by 7 points (MCID) to show reduction in disability and improvement in functionality. (5-6 weeks)

## 2025-04-08 ENCOUNTER — TELEPHONE (OUTPATIENT)
Dept: PAIN MEDICINE | Facility: CLINIC | Age: 85
End: 2025-04-08
Payer: MEDICARE

## 2025-04-08 NOTE — TELEPHONE ENCOUNTER
----- Message from Nurse Aditi NOLASCO sent at 2/27/2025  2:33 PM EST -----  He is still thinking about moving forward with procedure, but I wanted to get an approval before Dr. Bauman left.  ----- Message -----  From: Agata Bauman DO  Sent: 2/27/2025   2:14 PM EST  To: Aditi Haddad RN    yes  ----- Message -----  From: Aditi Haddad RN  Sent: 2/27/2025   1:22 PM EST  To: Agata Bauman DO    Pt. Scheduled for Caudal SELWYN with Dr. Keaton Valdivia.  Is it acceptable to you that he hold his aspirin for 6 days before and 24 hours after his procedure?

## 2025-04-09 ENCOUNTER — TREATMENT (OUTPATIENT)
Dept: PHYSICAL THERAPY | Facility: CLINIC | Age: 85
End: 2025-04-09
Payer: MEDICARE

## 2025-04-09 DIAGNOSIS — R26.89 DECREASED FUNCTIONAL MOBILITY: Primary | ICD-10-CM

## 2025-04-09 DIAGNOSIS — M47.817 SPONDYLOSIS OF LUMBOSACRAL REGION WITHOUT MYELOPATHY OR RADICULOPATHY: ICD-10-CM

## 2025-04-09 PROCEDURE — 97110 THERAPEUTIC EXERCISES: CPT | Mod: GP,CQ

## 2025-04-09 ASSESSMENT — PAIN - FUNCTIONAL ASSESSMENT: PAIN_FUNCTIONAL_ASSESSMENT: 0-10

## 2025-04-09 ASSESSMENT — PAIN SCALES - GENERAL: PAINLEVEL_OUTOF10: 5 - MODERATE PAIN

## 2025-04-09 NOTE — PROGRESS NOTES
Physical Therapy Treatment    Patient Name: John Hicks  MRN: [id number]  Today's Date: 4/9/2025  Time Calculation  Start Time: 1048  Stop Time: 1128  Time Calculation (min): 40 min  PT Therapeutic Procedures Time Entry  Therapeutic Exercise Time Entry: 38       Assessment:   Patient momo's antalgic gait throughout clinic this date. Patient able to tolerate standing PRE's with mild challenges. Demo's fatigue at end of session.    Plan:  Continue to work on improving strength and decreasing pain to be able to tolerate prolonged ambulation with little to no difficulty. -AB.     OP PT Plan  Treatment/Interventions: Aquatic therapy, Biofeedback, Blood flow restriction therapy, Cryotherapy, Dry needling, Electrical stimulation, Education/ Instruction, Gait training, Hot pack, Laser, Manual therapy, Mechanical traction, Neuromuscular re-education, Taping techniques, Therapeutic activities, Therapeutic exercises, Ultrasound, Vasopneumatic device  PT Plan: Skilled PT  PT Frequency: 1 time per week  Duration: 6 weeks or 6 visits  Onset Date: 04/17/23  Certification Period Start Date: 03/17/25  Certification Period End Date: 06/15/25  Rehab Potential: Fair  Plan of Care Agreement: Patient    Current Problem  Problem List Items Addressed This Visit             ICD-10-CM    Lumbosacral spondylosis M47.817    Decreased functional mobility - Primary R26.89       Subjective   General  Reason for Referral: LBP with stenosis  Referred By: Sammy Valdivia  General Comment: Visit 4/7 POC  Visit: 4  HEP: Yes  Patient states that his pain has moved to his hips now. States that he is compliant with his HEP.     Precautions  Precautions  Precautions Comment: Fall risk, DM, HTN, Prior lumbar laminectomies    Pain  Pain Assessment: 0-10  0-10 (Numeric) Pain Score: 5 - Moderate pain  Pain Type: Chronic pain  Pain Location: Hip  Pain Orientation: Right, Left    Objective     Treatments:  Therapeutic Exercise: 38 minutes, 3 units  Therapeutic  "exercise  Stepper x5' (HEP and Tissue extensibility)  Slantboard calf stretch 2x1'   Ball rollouts Fwd/lat 2' each   Seated HS stretch 3x30\"  Standing marching R/L 2x10  Standing Hip ABD R/L 2x10  Standing Hip Ext  R/L 2x10  Standing Knee flexion R/L 2x10  Standing heel/toe raises 2x10  Seated mid rows with mint band 2x10 (X)  Seated Hip ADD isometric with ball 3\" hold 2x10 (X)    OP EDUCATION:   Access Code: [email or web address]  URL: [email or web address]  Date: 03/26/2025  Prepared by: Albert Cobb    Exercises  - Seated March  - 1 x daily - 7 x weekly - 3 sets - 10 reps  - Seated Long Arc Quad  - 1 x daily - 7 x weekly - 3 sets - 10 reps  - Seated Hip Abduction with Resistance  - 1 x daily - 7 x weekly - 3 sets - 10 reps  - Seated Hip Adduction Isometrics with Ball  - 1 x daily - 7 x weekly - 3 sets - 10 reps - 3s hold  - Seated Heel Toe Raises  - 1 x daily - 7 x weekly - 3 sets - 10 reps    Goals:  Active       PT Problem       PT Low back Goals       Start:  03/17/25    Expected End:  05/16/25       1. Pt will adhere to and complete HEP in order to improve functionality outside of the clinic. (2 weeks)    2.   Pt will report 3/10 pain when performing Walking, Standing, Bending, Lifting, Twisting, Squatting, and household chores in order to improve quality of life, functional mobility, and maintain functional independence. (5-6 weeks)    3.   Pt will improve postural awareness in order to reduce reoccurrence of impairments. (2-3 weeks)    4.   Pt will increase strength of BLE to  5/5 in order to improve Walking, Standing, Bending, Lifting, Twisting, Squatting, and household chores.     5.   Pt will ambulate with  least restrictive assistive device with step through pattern  to decrease fall  risk and promote functional mobility. (4-5 weeks)    6.  Pt goal: Reduce pain     7. Pt will improve OWI by 7 points (MCID) to show reduction in disability and improvement in functionality. (5-6 weeks)            "

## 2025-04-16 ENCOUNTER — TREATMENT (OUTPATIENT)
Dept: PHYSICAL THERAPY | Facility: CLINIC | Age: 85
End: 2025-04-16
Payer: MEDICARE

## 2025-04-16 DIAGNOSIS — R26.89 DECREASED FUNCTIONAL MOBILITY: Primary | ICD-10-CM

## 2025-04-16 DIAGNOSIS — M47.817 SPONDYLOSIS OF LUMBOSACRAL REGION WITHOUT MYELOPATHY OR RADICULOPATHY: ICD-10-CM

## 2025-04-16 PROCEDURE — 97110 THERAPEUTIC EXERCISES: CPT | Mod: GP

## 2025-04-16 ASSESSMENT — PAIN - FUNCTIONAL ASSESSMENT: PAIN_FUNCTIONAL_ASSESSMENT: 0-10

## 2025-04-16 ASSESSMENT — PAIN SCALES - GENERAL: PAINLEVEL_OUTOF10: 4

## 2025-04-16 NOTE — PROGRESS NOTES
"Physical Therapy Treatment    Patient Name: John Hicks  MRN: [id number]  : 1940   Sammy Valdivia  Today's Date: 2025  Time Calculation  Start Time: 1047  Stop Time: 1129  Time Calculation (min): 42 min  PT Therapeutic Procedures Time Entry  Therapeutic Exercise Time Entry: 40           General  Reason for Referral: LBP with stenosis  Referred By: Sammy Valdivia  General Comment: Visit  POC  Visit #5     Current Problem  Problem List Items Addressed This Visit           ICD-10-CM       Neuro    Lumbosacral spondylosis M47.817       Symptoms and Signs    Decreased functional mobility - Primary R26.89            Subjective   Pt reports that his pain levels are feeling about the same as last visit and would rate pain at 4/10. Pt reports he had some fatigue after last session but was tolerable. Pt has been able to complete HEP without any issues at this time.      Pt. Reports compliance with HEP.    Precautions  Precautions  Precautions Comment: Fall risk, DM, HTN, Prior lumbar laminectomies    Pain  Pain Assessment: 0-10  0-10 (Numeric) Pain Score: 4  Pain Type: Chronic pain  Pain Location: Hip  Pain Orientation: Right, Left    Objective      Pt ambulates with antalgic gait, flexed posture.         Last Session:  Stepper x5' (HEP and Tissue extensibility)  Slantboard calf stretch 2x1'   Ball rollouts Fwd/lat 2' each   Seated HS stretch 3x30\"  Standing marching R/L 2x10  Standing Hip ABD R/L 2x10  Standing Hip Ext  R/L 2x10  Standing Knee flexion R/L 2x10  Standing heel/toe raises 2x10  Seated mid rows with mint band 2x10 (X)  Seated Hip ADD isometric with ball 3\" hold 2x10 (X)  Treatments:    Therapeutic exercise  Stepper x5' (HEP and tissue extensibility)  Slantboard calf stretch 2x1'  Ball rollouts fwd/lat x2' each  Seated HS stretch 2x30\"  Standing Marching to cones R/L 2x15  Standing Hip ABD R/L 2x15  Standing Hip Ext R/L x10 (Increased pain noted)  Standing Knee flexion R/L 2x15  Standing heel and " toe raises 2x15  Standing Hip flex R/L 2x10        Current HEP:   Access Code: [email or web address]  URL: [email or web address]  Date: 04/16/2025  Prepared by: Albert Cobb    Exercises  - Seated March  - 1 x daily - 7 x weekly - 3 sets - 10 reps  - Seated Long Arc Quad  - 1 x daily - 7 x weekly - 3 sets - 10 reps  - Seated Hip Abduction with Resistance  - 1 x daily - 7 x weekly - 3 sets - 10 reps  - Seated Hip Adduction Isometrics with Ball  - 1 x daily - 7 x weekly - 3 sets - 10 reps - 3s hold  - Seated Heel Toe Raises  - 1 x daily - 7 x weekly - 3 sets - 10 reps  - Standing March with Counter Support  - 1 x daily - 7 x weekly - 3 sets - 10 reps  - Standing Hip Abduction with Counter Support  - 1 x daily - 7 x weekly - 3 sets - 10 reps  - Standing Hip Flexion with Counter Support  - 1 x daily - 7 x weekly - 3 sets - 10 reps    Assessment:     Pt tolerated session well with no increase in pain post session just fatigue with progressed exercises. Pt HEP updated to progress LE strengthening. Pt still having trouble with lumbar ext as well as ambulating without antalgic gait.     Plan:  OP PT Plan  Treatment/Interventions: Aquatic therapy, Biofeedback, Blood flow restriction therapy, Cryotherapy, Dry needling, Electrical stimulation, Education/ Instruction, Gait training, Hot pack, Laser, Manual therapy, Mechanical traction, Neuromuscular re-education, Taping techniques, Therapeutic activities, Therapeutic exercises, Ultrasound, Vasopneumatic device  PT Plan: Skilled PT  PT Frequency: 1 time per week  Duration: 6 weeks or 6 visits  Onset Date: 04/17/23  Certification Period Start Date: 03/17/25  Certification Period End Date: 06/15/25  Rehab Potential: Fair  Plan of Care Agreement: Patient    Goals:  Active       PT Problem       PT Low back Goals       Start:  03/17/25    Expected End:  05/16/25       1. Pt will adhere to and complete HEP in order to improve functionality outside of the clinic. (2  weeks)    2.   Pt will report 3/10 pain when performing Walking, Standing, Bending, Lifting, Twisting, Squatting, and household chores in order to improve quality of life, functional mobility, and maintain functional independence. (5-6 weeks)    3.   Pt will improve postural awareness in order to reduce reoccurrence of impairments. (2-3 weeks)    4.   Pt will increase strength of BLE to  5/5 in order to improve Walking, Standing, Bending, Lifting, Twisting, Squatting, and household chores.     5.   Pt will ambulate with  least restrictive assistive device with step through pattern  to decrease fall  risk and promote functional mobility. (4-5 weeks)    6.  Pt goal: Reduce pain     7. Pt will improve OWI by 7 points (MCID) to show reduction in disability and improvement in functionality. (5-6 weeks)

## 2025-04-23 ENCOUNTER — APPOINTMENT (OUTPATIENT)
Dept: PHYSICAL THERAPY | Facility: CLINIC | Age: 85
End: 2025-04-23
Payer: MEDICARE

## 2025-04-23 DIAGNOSIS — M47.817 SPONDYLOSIS OF LUMBOSACRAL REGION WITHOUT MYELOPATHY OR RADICULOPATHY: ICD-10-CM

## 2025-04-23 DIAGNOSIS — R26.89 DECREASED FUNCTIONAL MOBILITY: Primary | ICD-10-CM

## 2025-04-28 ENCOUNTER — TELEPHONE (OUTPATIENT)
Dept: PRIMARY CARE | Facility: CLINIC | Age: 85
End: 2025-04-28
Payer: MEDICARE

## 2025-04-29 NOTE — TELEPHONE ENCOUNTER
Spoke to pt and let him know what PCP advised, he expressed an understanding and will call us when he needs a refill.

## 2025-04-29 NOTE — TELEPHONE ENCOUNTER
Let him know that we can switch him to a different brand like Lantus or Levemir.  Both work the same.  Have him let us know when he needs a refill and we will switch it at that time.

## 2025-04-30 ENCOUNTER — HOSPITAL ENCOUNTER (EMERGENCY)
Facility: HOSPITAL | Age: 85
Discharge: HOME | End: 2025-04-30
Payer: MEDICARE

## 2025-04-30 ENCOUNTER — APPOINTMENT (OUTPATIENT)
Dept: PHYSICAL THERAPY | Facility: CLINIC | Age: 85
End: 2025-04-30
Payer: MEDICARE

## 2025-04-30 VITALS
DIASTOLIC BLOOD PRESSURE: 87 MMHG | WEIGHT: 315 LBS | BODY MASS INDEX: 44.1 KG/M2 | SYSTOLIC BLOOD PRESSURE: 154 MMHG | RESPIRATION RATE: 16 BRPM | OXYGEN SATURATION: 96 % | HEIGHT: 71 IN | TEMPERATURE: 98.2 F | HEART RATE: 75 BPM

## 2025-04-30 DIAGNOSIS — G89.29 ACUTE EXACERBATION OF CHRONIC LOW BACK PAIN: Primary | ICD-10-CM

## 2025-04-30 DIAGNOSIS — M47.817 SPONDYLOSIS OF LUMBOSACRAL REGION WITHOUT MYELOPATHY OR RADICULOPATHY: ICD-10-CM

## 2025-04-30 DIAGNOSIS — R26.89 DECREASED FUNCTIONAL MOBILITY: Primary | ICD-10-CM

## 2025-04-30 DIAGNOSIS — M54.50 ACUTE EXACERBATION OF CHRONIC LOW BACK PAIN: Primary | ICD-10-CM

## 2025-04-30 DIAGNOSIS — M54.32 SCIATICA OF LEFT SIDE: ICD-10-CM

## 2025-04-30 PROCEDURE — 2500000004 HC RX 250 GENERAL PHARMACY W/ HCPCS (ALT 636 FOR OP/ED): Mod: JZ | Performed by: PHYSICIAN ASSISTANT

## 2025-04-30 PROCEDURE — 99284 EMERGENCY DEPT VISIT MOD MDM: CPT

## 2025-04-30 PROCEDURE — 96372 THER/PROPH/DIAG INJ SC/IM: CPT | Performed by: PHYSICIAN ASSISTANT

## 2025-04-30 RX ORDER — ONDANSETRON 4 MG/1
4 TABLET, ORALLY DISINTEGRATING ORAL ONCE
Status: COMPLETED | OUTPATIENT
Start: 2025-04-30 | End: 2025-04-30

## 2025-04-30 RX ORDER — HYDROMORPHONE HYDROCHLORIDE 1 MG/ML
1 INJECTION, SOLUTION INTRAMUSCULAR; INTRAVENOUS; SUBCUTANEOUS ONCE
Status: COMPLETED | OUTPATIENT
Start: 2025-04-30 | End: 2025-04-30

## 2025-04-30 RX ORDER — TRAMADOL HYDROCHLORIDE 50 MG/1
50 TABLET ORAL EVERY 6 HOURS PRN
Qty: 12 TABLET | Refills: 0 | Status: SHIPPED | OUTPATIENT
Start: 2025-04-30 | End: 2025-05-03

## 2025-04-30 RX ORDER — PREDNISONE 50 MG/1
50 TABLET ORAL DAILY
Qty: 5 TABLET | Refills: 0 | Status: SHIPPED | OUTPATIENT
Start: 2025-04-30 | End: 2025-05-05

## 2025-04-30 RX ORDER — CYCLOBENZAPRINE HCL 10 MG
10 TABLET ORAL 3 TIMES DAILY PRN
Qty: 9 TABLET | Refills: 0 | Status: SHIPPED | OUTPATIENT
Start: 2025-04-30 | End: 2025-05-03

## 2025-04-30 RX ADMIN — ONDANSETRON 4 MG: 4 TABLET, ORALLY DISINTEGRATING ORAL at 15:56

## 2025-04-30 RX ADMIN — HYDROMORPHONE HYDROCHLORIDE 1 MG: 1 INJECTION, SOLUTION INTRAMUSCULAR; INTRAVENOUS; SUBCUTANEOUS at 16:00

## 2025-04-30 RX ADMIN — METHYLPREDNISOLONE SODIUM SUCCINATE 125 MG: 125 INJECTION, POWDER, FOR SOLUTION INTRAMUSCULAR; INTRAVENOUS at 15:57

## 2025-04-30 ASSESSMENT — PAIN SCALES - GENERAL
PAINLEVEL_OUTOF10: 0 - NO PAIN
PAINLEVEL_OUTOF10: 10 - WORST POSSIBLE PAIN
PAINLEVEL_OUTOF10: 0 - NO PAIN
PAINLEVEL_OUTOF10: 0 - NO PAIN
PAINLEVEL_OUTOF10: 10 - WORST POSSIBLE PAIN

## 2025-04-30 ASSESSMENT — ENCOUNTER SYMPTOMS
COUGH: 0
EYE PAIN: 0
SHORTNESS OF BREATH: 0
CHILLS: 0
ARTHRALGIAS: 0
HEMATURIA: 0
BACK PAIN: 1
FEVER: 0
DYSURIA: 0
SORE THROAT: 0
SEIZURES: 0
COLOR CHANGE: 0
WEAKNESS: 0
PALPITATIONS: 0
NUMBNESS: 0
ABDOMINAL PAIN: 0
VOMITING: 0

## 2025-04-30 ASSESSMENT — COLUMBIA-SUICIDE SEVERITY RATING SCALE - C-SSRS
1. IN THE PAST MONTH, HAVE YOU WISHED YOU WERE DEAD OR WISHED YOU COULD GO TO SLEEP AND NOT WAKE UP?: NO
2. HAVE YOU ACTUALLY HAD ANY THOUGHTS OF KILLING YOURSELF?: NO
6. HAVE YOU EVER DONE ANYTHING, STARTED TO DO ANYTHING, OR PREPARED TO DO ANYTHING TO END YOUR LIFE?: NO

## 2025-04-30 ASSESSMENT — PAIN - FUNCTIONAL ASSESSMENT
PAIN_FUNCTIONAL_ASSESSMENT: 0-10

## 2025-04-30 ASSESSMENT — PAIN DESCRIPTION - LOCATION: LOCATION: BACK

## 2025-04-30 NOTE — ED PROVIDER NOTES
Patient is an 83-year-old male who presents with left back pain radiating to his hip.  Patient has a history of chronic back pain.  States that he had a laminectomy in 1985 and has had chronic back problems since then.  He states that he had at one point was managed by pain management.  Recently had an appointment with pain management who recommended that he have physical therapy.  He states that he saw his PCP who scheduled him for physical therapy and he has been going to physical therapy but reports that his pain has actually worsened since starting physical therapy.  He states his pain is worse with movement and certain positions.  He denies any bowel or bladder incontinence or retention.  No saddle anesthesia.  No fever or chills.  He denies any injury or trauma.  He states that this is his typical pain that he has when he has a flareup of his back.  He had a similar presentation recently and was treated with medication.  Patient reports that he had an MRI in February.  He states that after his MRI, that is when he saw pain management.           Review of Systems   Constitutional:  Negative for chills and fever.   HENT:  Negative for ear pain and sore throat.    Eyes:  Negative for pain and visual disturbance.   Respiratory:  Negative for cough and shortness of breath.    Cardiovascular:  Negative for chest pain and palpitations.   Gastrointestinal:  Negative for abdominal pain and vomiting.   Genitourinary:  Negative for dysuria and hematuria.   Musculoskeletal:  Positive for back pain. Negative for arthralgias.   Skin:  Negative for color change and rash.   Neurological:  Negative for seizures, syncope, weakness and numbness.   All other systems reviewed and are negative.       Physical Exam  Vitals and nursing note reviewed.   Constitutional:       General: He is not in acute distress.     Appearance: Normal appearance. He is well-developed.   HENT:      Head: Normocephalic and atraumatic.      Mouth/Throat:       Pharynx: No oropharyngeal exudate or posterior oropharyngeal erythema.   Eyes:      Extraocular Movements: Extraocular movements intact.      Conjunctiva/sclera: Conjunctivae normal.   Cardiovascular:      Rate and Rhythm: Normal rate and regular rhythm.      Heart sounds: No murmur heard.  Pulmonary:      Effort: Pulmonary effort is normal. No respiratory distress.      Breath sounds: Normal breath sounds. No stridor. No wheezing, rhonchi or rales.   Abdominal:      General: There is no distension.      Palpations: Abdomen is soft. There is no mass.      Tenderness: There is no abdominal tenderness. There is no guarding or rebound.      Hernia: No hernia is present.   Musculoskeletal:         General: No swelling.      Cervical back: Normal range of motion and neck supple. No rigidity.        Back:       Comments: No ecchymosis, rash, erythema, or signs of trauma. No swelling   Skin:     General: Skin is warm and dry.      Capillary Refill: Capillary refill takes less than 2 seconds.   Neurological:      Mental Status: He is alert.   Psychiatric:         Mood and Affect: Mood normal.          Labs Reviewed - No data to display     No orders to display        Procedures     Medical Decision Making  Patient is an 83-year-old male with a history of chronic low back pain who presents today with an acute exacerbation of his back pain radiating to his left hip.  No known injury.  He did recently start physical therapy per recommendation of his PCP and pain management in which he states the physical therapy is exacerbating his symptoms.  He denies any signs of cauda equina.  Good strength on physical examination.  Given that this is chronic with no new injury and no signs of cauda equina or weakness I do not feel that any new imaging is indicated.  I did review the MRI performed in February.  Patient was treated symptomatically.  He reports improvement of his symptoms and will be discharged home with recommended  follow-up with his PCP.  Tramadol was prescribed along with Flexeril and prednisone.  He was instructed to return for any new or worsening symptoms.    DDX includes but not limited to: Acute exasperation of chronic low back pain, DDD, sciatica, spinal abscess, cauda equina    Risk  Prescription drug management.         Diagnoses as of 04/30/25 1700   Acute exacerbation of chronic low back pain   Sciatica of left side                    Tashia Silva PA-C  04/30/25 1700

## 2025-05-01 ENCOUNTER — TELEPHONE (OUTPATIENT)
Dept: EMERGENCY MEDICINE | Facility: HOSPITAL | Age: 85
End: 2025-05-01
Payer: MEDICARE

## 2025-05-01 ENCOUNTER — TELEPHONE (OUTPATIENT)
Dept: PRIMARY CARE | Facility: CLINIC | Age: 85
End: 2025-05-01
Payer: MEDICARE

## 2025-05-01 NOTE — ED NOTES
Pt was seen in ED yesterday for back pain. Pt was given a steroid injection and Rx for oral steroids. Pt is concerned as BG was over 200 this morning. Pt advised that steroids will increased BG and to consult with PCP for advise on BG control while on steroids.      Mel South RN  05/01/25 [id number]

## 2025-05-01 NOTE — TELEPHONE ENCOUNTER
Message from patient that he has been having low back pain.  Wife took him to ER last night and they gave him a couple of shots.  This morning his sugar was 219, which is high for him.  Not sure why so he talked to ER and they told him it was probably because of the shots.  Was to get some pills, but that was last night and he hasn't gotten them.  Please call him, as he's not sure what to do about the sugar

## 2025-05-07 ENCOUNTER — TELEPHONE (OUTPATIENT)
Dept: PRIMARY CARE | Facility: CLINIC | Age: 85
End: 2025-05-07
Payer: MEDICARE

## 2025-05-07 NOTE — TELEPHONE ENCOUNTER
PT wife called in and stated he was on vacation and in severe back pain, went to the ER and was only given a small supply of pain medication and they will not refill this unless he is brought back. Is requesting a call back [phone]

## 2025-05-07 NOTE — TELEPHONE ENCOUNTER
Is requesting a refill on the tramadol for pain, reports they have an appointment with Marilyn on Tuesday. I explained Sammy was out of the office and there was no other physicians here to address this but I could forward message to a physician to see what they say, but if the pain gets worse there is always the ER or urgent care. If you could please advise.

## 2025-05-08 DIAGNOSIS — M54.50 ACUTE EXACERBATION OF CHRONIC LOW BACK PAIN: ICD-10-CM

## 2025-05-08 DIAGNOSIS — G89.29 ACUTE EXACERBATION OF CHRONIC LOW BACK PAIN: ICD-10-CM

## 2025-05-08 DIAGNOSIS — M54.32 SCIATICA OF LEFT SIDE: ICD-10-CM

## 2025-05-08 RX ORDER — CYCLOBENZAPRINE HCL 10 MG
10 TABLET ORAL 3 TIMES DAILY PRN
Qty: 30 TABLET | Refills: 0 | Status: SHIPPED | OUTPATIENT
Start: 2025-05-08 | End: 2025-07-07

## 2025-05-08 NOTE — TELEPHONE ENCOUNTER
Patient and wife was notified they are willing to try the Flexeril again. She stated if this does not help she will just take him back out to the ER. As of right now he is in so much pain he can not get up and to the bathroom. She doesn't know if it was the tramadol or the flexeril that was helping him move better before, as he took them both together. They are willing to try anything. Thanks!

## 2025-05-13 ENCOUNTER — APPOINTMENT (OUTPATIENT)
Dept: PRIMARY CARE | Facility: CLINIC | Age: 85
End: 2025-05-13
Payer: MEDICARE

## 2025-05-13 VITALS
OXYGEN SATURATION: 100 % | BODY MASS INDEX: 43.24 KG/M2 | SYSTOLIC BLOOD PRESSURE: 146 MMHG | HEART RATE: 82 BPM | DIASTOLIC BLOOD PRESSURE: 69 MMHG | WEIGHT: 310 LBS

## 2025-05-13 DIAGNOSIS — M47.817 SPONDYLOSIS OF LUMBOSACRAL REGION WITHOUT MYELOPATHY OR RADICULOPATHY: Primary | ICD-10-CM

## 2025-05-13 DIAGNOSIS — R35.0 URINARY FREQUENCY: ICD-10-CM

## 2025-05-13 PROCEDURE — 99214 OFFICE O/P EST MOD 30 MIN: CPT | Performed by: NURSE PRACTITIONER

## 2025-05-13 PROCEDURE — 3078F DIAST BP <80 MM HG: CPT | Performed by: NURSE PRACTITIONER

## 2025-05-13 PROCEDURE — 1160F RVW MEDS BY RX/DR IN RCRD: CPT | Performed by: NURSE PRACTITIONER

## 2025-05-13 PROCEDURE — 1036F TOBACCO NON-USER: CPT | Performed by: NURSE PRACTITIONER

## 2025-05-13 PROCEDURE — 3077F SYST BP >= 140 MM HG: CPT | Performed by: NURSE PRACTITIONER

## 2025-05-13 PROCEDURE — 1159F MED LIST DOCD IN RCRD: CPT | Performed by: NURSE PRACTITIONER

## 2025-05-13 RX ORDER — BACLOFEN 5 MG/1
5 TABLET ORAL NIGHTLY
Qty: 30 TABLET | Refills: 1 | Status: SHIPPED | OUTPATIENT
Start: 2025-05-13 | End: 2025-07-12

## 2025-05-13 ASSESSMENT — ENCOUNTER SYMPTOMS
FEVER: 0
VOMITING: 0
SHORTNESS OF BREATH: 0
CONSTIPATION: 0
ABDOMINAL PAIN: 0
COUGH: 0
WHEEZING: 0
NAUSEA: 0
DIARRHEA: 0
NUMBNESS: 0
BACK PAIN: 1
PALPITATIONS: 0
LIGHT-HEADEDNESS: 0
ABDOMINAL DISTENTION: 0
DIZZINESS: 0
FREQUENCY: 1
WEAKNESS: 0

## 2025-05-13 NOTE — PROGRESS NOTES
Subjective   Patient ID: John Hicks is a 84 y.o. male who presents for ER Follow-up (Back pain 4/30/25 originally was on left side down outer leg through thigh, now pain on both sides/Did PT 1 x week //), Arm Pain (Left upper arm pain with lump (under skin)), and Urinary Frequency (Previously seen Dr. Mayberry ).  85 yo male patient presents today for ER follow up with complaints of chronic low back pain.  ER was on 4-30-25, MRI reviewed, PT was already ordered by pcp and Tramadol, Flexeril, and Prednisone was prescribed by ER.  Patient states he is now having pain down both legs.    Diabetes;  yesterday and 202 BS today.  Past few days has had urinary frequency.  States he took Flexeril and he felt fatigued and out of sorts the next day.  He does have an appt with pain mgmt end of June.      ER Follow-up  Pertinent negatives include no abdominal pain, chest pain, coughing, fever, nausea, numbness, vomiting or weakness.   Arm Pain   Pertinent negatives include no chest pain or numbness.   Urinary Frequency   Associated symptoms include frequency. Pertinent negatives include no nausea or vomiting.       Review of Systems   Constitutional:  Negative for fever.   Respiratory:  Negative for cough, shortness of breath and wheezing.    Cardiovascular:  Negative for chest pain and palpitations.   Gastrointestinal:  Negative for abdominal distention, abdominal pain, constipation, diarrhea, nausea and vomiting.   Genitourinary:  Positive for frequency.        Denies any urinary or bowel incontinence.     Musculoskeletal:  Positive for back pain.        Walks with a cane.  No falls.     Neurological:  Negative for dizziness, weakness, light-headedness and numbness.       Objective   Physical Exam  Vitals and nursing note reviewed.   Constitutional:       General: He is not in acute distress.  Cardiovascular:      Rate and Rhythm: Normal rate and regular rhythm.      Pulses: Normal pulses.      Heart sounds: Normal heart  sounds. No murmur heard.     No friction rub. No gallop.   Pulmonary:      Effort: Pulmonary effort is normal.      Breath sounds: Normal breath sounds. No wheezing, rhonchi or rales.   Abdominal:      General: Bowel sounds are normal.   Musculoskeletal:         General: No swelling or tenderness.      Right lower leg: No edema.      Left lower leg: No edema.      Comments: No swelling or erythema noted to left arm where injections given at ER.     Skin:     General: Skin is warm and dry.   Neurological:      Mental Status: He is alert.   Psychiatric:         Mood and Affect: Mood normal.       /69   Pulse 82   Wt 141 kg (310 lb)   SpO2 100%   BMI 43.24 kg/m²     Current Medications[1]   Medical History[2]   Surgical History[3]     Family History[4]     Assessment/Plan   Problem List Items Addressed This Visit       Lumbosacral spondylosis - Primary    Relevant Medications    baclofen (Lioresal) 5 mg tablet     Other Visit Diagnoses         Urinary frequency        Relevant Orders    Urinalysis with Reflex Culture and Microscopic        Urinary frequency probably due to elevated BS while on Prednisone.  Avoid foods high in carbs.  Will get UA to r/o UTI.  Pt to bring in BS readings with him to his appt with pcp in 2 wks.    Stop Flexeril and start Baclofen 5mg; take 1/2 tablet at hs.  If no SE then may try one tablet at hs.  Get appt with pain mgmt sooner.           [1]   Current Outpatient Medications:     aspirin 81 mg EC tablet, Take 1 tablet (81 mg) by mouth once daily., Disp: , Rfl:     blood-glucose meter misc, TEST SUGARS ONCE DAILY, Disp: 1 each, Rfl: 0    glipiZIDE (Glucotrol) 10 mg tablet, Take 1 tablet (10 mg) by mouth 2 times a day before meals., Disp: 180 tablet, Rfl: 1    insulin degludec (Tresiba FlexTouch) 100 unit/mL (3 mL) injection, Inject 16 Units under the skin once daily in the morning., Disp: 3 mL, Rfl: 11    lancets misc, TEST SUGARS ONCE DAILY, Disp: 50 each, Rfl: 11    metFORMIN  (Glucophage) 1,000 mg tablet, Take 1 tablet (1,000 mg) by mouth 2 times a day with meals., Disp: 180 tablet, Rfl: 3    metoprolol succinate XL (Toprol-XL) 25 mg 24 hr tablet, TAKE 1 TABLET BY MOUTH ONCE DAILY, Disp: 90 tablet, Rfl: 3    multivitamin tablet, Take 1 tablet by mouth once daily., Disp: , Rfl:     OneTouch Delica Plus Lancet 30 gauge misc, Use to test BLOOD SUGAR ONCE DAILY, Disp: , Rfl:     OneTouch Verio test strips strip, Use to test BLOOD SUGAR TWICE DAILY, Disp: 100 strip, Rfl: 11    simvastatin (Zocor) 40 mg tablet, TAKE 1 TABLET BY MOUTH AT BEDTIME, Disp: 90 tablet, Rfl: 3    baclofen (Lioresal) 5 mg tablet, Take 1 tablet (5 mg) by mouth once daily at bedtime., Disp: 30 tablet, Rfl: 1    cyclobenzaprine (Flexeril) 10 mg tablet, Take 1 tablet (10 mg) by mouth 3 times a day as needed for muscle spasms. (Patient not taking: Reported on 5/13/2025), Disp: 30 tablet, Rfl: 0  [2]   Past Medical History:  Diagnosis Date    Diabetes mellitus (Multi)     Hypertension     Other specified health status     No pertinent past medical history   [3]   Past Surgical History:  Procedure Laterality Date    INJECTION Bilateral 01/17/2025    Bilat L4/5 TFESI    OTHER SURGICAL HISTORY  04/12/2022    Gallbladder surgery    OTHER SURGICAL HISTORY  04/12/2022    Back surgery    OTHER SURGICAL HISTORY  04/12/2022    Tonsillectomy   [4]   Family History  Problem Relation Name Age of Onset    Heart attack Mother      Lung cancer Father          non-small cell carcinoma of lung    Breast cancer Sister      Cancer Other

## 2025-05-14 LAB
APPEARANCE UR: CLEAR
BACTERIA #/AREA URNS HPF: ABNORMAL /HPF
BACTERIA UR CULT: ABNORMAL
BILIRUB UR QL STRIP: NEGATIVE
COLOR UR: YELLOW
GLUCOSE UR QL STRIP: NEGATIVE
HGB UR QL STRIP: NEGATIVE
HYALINE CASTS #/AREA URNS LPF: ABNORMAL /LPF
KETONES UR QL STRIP: ABNORMAL
LEUKOCYTE ESTERASE UR QL STRIP: NEGATIVE
NITRITE UR QL STRIP: NEGATIVE
PH UR STRIP: 7 [PH] (ref 5–8)
PROT UR QL STRIP: ABNORMAL
RBC #/AREA URNS HPF: ABNORMAL /HPF
SERVICE CMNT-IMP: ABNORMAL
SP GR UR STRIP: 1.01 (ref 1–1.03)
SQUAMOUS #/AREA URNS HPF: ABNORMAL /HPF
WBC #/AREA URNS HPF: ABNORMAL /HPF

## 2025-05-30 ENCOUNTER — APPOINTMENT (OUTPATIENT)
Dept: PRIMARY CARE | Facility: CLINIC | Age: 85
End: 2025-05-30
Payer: MEDICARE

## 2025-05-30 VITALS
BODY MASS INDEX: 44.1 KG/M2 | OXYGEN SATURATION: 98 % | HEART RATE: 82 BPM | DIASTOLIC BLOOD PRESSURE: 80 MMHG | SYSTOLIC BLOOD PRESSURE: 152 MMHG | HEIGHT: 71 IN | WEIGHT: 315 LBS

## 2025-05-30 DIAGNOSIS — M54.50 ACUTE EXACERBATION OF CHRONIC LOW BACK PAIN: ICD-10-CM

## 2025-05-30 DIAGNOSIS — M54.32 SCIATICA OF LEFT SIDE: ICD-10-CM

## 2025-05-30 DIAGNOSIS — G89.29 ACUTE EXACERBATION OF CHRONIC LOW BACK PAIN: ICD-10-CM

## 2025-05-30 PROCEDURE — 1036F TOBACCO NON-USER: CPT

## 2025-05-30 PROCEDURE — 99214 OFFICE O/P EST MOD 30 MIN: CPT

## 2025-05-30 PROCEDURE — 3077F SYST BP >= 140 MM HG: CPT

## 2025-05-30 PROCEDURE — 3079F DIAST BP 80-89 MM HG: CPT

## 2025-05-30 PROCEDURE — 1159F MED LIST DOCD IN RCRD: CPT

## 2025-05-30 RX ORDER — GABAPENTIN 300 MG/1
300 CAPSULE ORAL NIGHTLY
Qty: 30 CAPSULE | Refills: 0 | Status: SHIPPED | OUTPATIENT
Start: 2025-05-30 | End: 2025-06-29

## 2025-05-30 ASSESSMENT — ENCOUNTER SYMPTOMS
PALPITATIONS: 0
CHILLS: 0
DIARRHEA: 0
HEADACHES: 0
BACK PAIN: 1
WEAKNESS: 0
ABDOMINAL PAIN: 0
NUMBNESS: 0
NERVOUS/ANXIOUS: 0
DIFFICULTY URINATING: 0
ARTHRALGIAS: 1
RECTAL PAIN: 0
SHORTNESS OF BREATH: 0
CONSTIPATION: 0
TROUBLE SWALLOWING: 0
NAUSEA: 0
DIAPHORESIS: 0
UNEXPECTED WEIGHT CHANGE: 0
FREQUENCY: 0
MYALGIAS: 1
FATIGUE: 0
POLYDIPSIA: 0
WOUND: 0
POLYPHAGIA: 0
CHEST TIGHTNESS: 0

## 2025-05-30 NOTE — PROGRESS NOTES
Subjective   Patient ID: John Hicks is a 84 y.o. male who presents for 2 week (PT is here today for 2 week FUV. AWV due 11/13/25).    Patient presents today for regular checkup.  Evaluation of back pain and diabetes.    Type 2 diabetes mellitus: Patient has had better sugars on his new medication regimen.  Reduced readings from last 13 days.  Average 140 fasting in the morning over 30 days.  Denies any polys, no side effects.  States that his only highs, when he does not eat correctly.  No need to change medications at this time.    Lumbosacral pain: Was seen by NP in this office and had Flexeril changed to baclofen.  Patient states that medication is not making major difference but he is not feeling any worse either.  Primary issue is sleep related.  Has difficulty getting comfortable sleeping.  Did discuss treatment options with him will prescribe him gabapentin 300 mg for bedtime use only.  Starting with 30-day prescription he will communicate to us if he wants to continue past that.  He has a appointment in 3 days with pain management, possible injection for treatment.  They performed recent MRI in February which showed minimal changes from previous chronic conditions.  He has also stopped physical therapy as he felt it was exacerbating his pain but is still trying to do exercises at home.                               Review of Systems   Constitutional:  Negative for chills, diaphoresis, fatigue and unexpected weight change.   HENT:  Negative for dental problem, tinnitus and trouble swallowing.    Eyes:  Negative for visual disturbance.   Respiratory:  Negative for chest tightness and shortness of breath.    Cardiovascular:  Negative for chest pain, palpitations and leg swelling.   Gastrointestinal:  Negative for abdominal pain, constipation, diarrhea, nausea and rectal pain.   Endocrine: Negative for polydipsia, polyphagia and polyuria.   Genitourinary:  Negative for difficulty urinating, frequency and  "urgency.   Musculoskeletal:  Positive for arthralgias, back pain, gait problem and myalgias.   Skin:  Negative for pallor and wound.   Neurological:  Negative for syncope, weakness, numbness and headaches.   Psychiatric/Behavioral:  Negative for suicidal ideas. The patient is not nervous/anxious.        Objective   /80   Pulse 82   Ht 1.803 m (5' 11\")   Wt 147 kg (323 lb)   SpO2 98%   BMI 45.05 kg/m²     Physical Exam  Vitals and nursing note reviewed.   Constitutional:       General: He is not in acute distress.     Appearance: Normal appearance.   HENT:      Head: Normocephalic.      Nose: Nose normal.      Mouth/Throat:      Mouth: Mucous membranes are moist.      Pharynx: Oropharynx is clear.   Eyes:      General: No scleral icterus.     Pupils: Pupils are equal, round, and reactive to light.   Neck:      Vascular: No carotid bruit.   Cardiovascular:      Rate and Rhythm: Normal rate and regular rhythm.      Pulses: Normal pulses.      Heart sounds: Normal heart sounds. No murmur heard.  Pulmonary:      Effort: Pulmonary effort is normal. No respiratory distress.      Breath sounds: Normal breath sounds. No stridor. No wheezing, rhonchi or rales.   Abdominal:      General: Bowel sounds are normal. There is no distension.      Palpations: Abdomen is soft.      Tenderness: There is no abdominal tenderness. There is no right CVA tenderness or left CVA tenderness.   Musculoskeletal:         General: Tenderness (Lower back) present. No swelling. Normal range of motion.      Cervical back: Normal range of motion.      Right lower leg: No edema.      Left lower leg: No edema.   Skin:     General: Skin is warm and dry.      Capillary Refill: Capillary refill takes less than 2 seconds.   Neurological:      General: No focal deficit present.      Mental Status: He is alert and oriented to person, place, and time. Mental status is at baseline.   Psychiatric:         Mood and Affect: Mood normal.         " Behavior: Behavior normal.         Thought Content: Thought content normal.         Judgment: Judgment normal.         Assessment/Plan   Diagnoses and all orders for this visit:  Acute exacerbation of chronic low back pain  -     Referral to Primary Care  -     gabapentin (Neurontin) 300 mg capsule; Take 1 capsule (300 mg) by mouth once daily at bedtime.  Sciatica of left side  -     Referral to Primary Care

## 2025-06-03 ENCOUNTER — DOCUMENTATION (OUTPATIENT)
Dept: PHYSICAL THERAPY | Facility: CLINIC | Age: 85
End: 2025-06-03
Payer: MEDICARE

## 2025-06-03 DIAGNOSIS — M47.817 SPONDYLOSIS OF LUMBOSACRAL REGION WITHOUT MYELOPATHY OR RADICULOPATHY: ICD-10-CM

## 2025-06-03 DIAGNOSIS — R26.89 DECREASED FUNCTIONAL MOBILITY: Primary | ICD-10-CM

## 2025-06-03 NOTE — PROGRESS NOTES
Physical Therapy    Discharge Summary    Name: John Hicks  MRN: [id number]  : 1940  Date: 25    Discharge Summary: PT    Discharge Information: Date of discharge 6/3/25    Therapy Summary: Pt not seen in skilled PT in over 30 days without a formal recheck. Pt will follow up with MD as needed. Any progress can be seen in prior notes.     Discharge Status: Discharged      Rehab Discharge Reason: Failed to schedule and/or keep follow-up appointment(s)

## 2025-06-19 DIAGNOSIS — N18.31 STAGE 3A CHRONIC KIDNEY DISEASE (MULTI): ICD-10-CM

## 2025-06-19 DIAGNOSIS — E11.9 CONTROLLED TYPE 2 DIABETES MELLITUS WITHOUT COMPLICATION, WITHOUT LONG-TERM CURRENT USE OF INSULIN: ICD-10-CM

## 2025-06-20 ENCOUNTER — TELEPHONE (OUTPATIENT)
Dept: PRIMARY CARE | Facility: CLINIC | Age: 85
End: 2025-06-20
Payer: MEDICARE

## 2025-06-20 NOTE — TELEPHONE ENCOUNTER
Pt is currently on Tresiba FlexTouch and insurance will no longer cover this - asking for a new script/medication to be called in.

## 2025-06-24 RX ORDER — INSULIN GLARGINE 100 [IU]/ML
16 INJECTION, SOLUTION SUBCUTANEOUS EVERY MORNING
Qty: 3 ML | Refills: 12 | Status: SHIPPED | OUTPATIENT
Start: 2025-06-24 | End: 2025-06-25

## 2025-06-25 ENCOUNTER — APPOINTMENT (OUTPATIENT)
Dept: PRIMARY CARE | Facility: CLINIC | Age: 85
End: 2025-06-25
Payer: MEDICARE

## 2025-06-25 ENCOUNTER — TELEPHONE (OUTPATIENT)
Dept: PRIMARY CARE | Facility: CLINIC | Age: 85
End: 2025-06-25

## 2025-06-25 DIAGNOSIS — E11.9 CONTROLLED TYPE 2 DIABETES MELLITUS WITHOUT COMPLICATION, WITHOUT LONG-TERM CURRENT USE OF INSULIN: Primary | Chronic | ICD-10-CM

## 2025-06-25 RX ORDER — INSULIN GLARGINE 100 [IU]/ML
16 INJECTION, SOLUTION SUBCUTANEOUS NIGHTLY
Qty: 6 ML | Refills: 9 | Status: SHIPPED | OUTPATIENT
Start: 2025-06-25 | End: 2026-07-05

## 2025-06-25 NOTE — TELEPHONE ENCOUNTER
Mikie from [facility] in [place] called in and stated that the Lantus insulin pen is no longer covered per his insurance but the Basaglar pen is. Spoke to pt and family and they are ok with the switch as long as PCP is. Please call and let him know. [phone]

## 2025-06-25 NOTE — TELEPHONE ENCOUNTER
Spoke to Mikie and let him know that PCP sent over a new RX. He stated an understanding and nothing else was needed at this time.

## 2025-06-30 ENCOUNTER — OFFICE VISIT (OUTPATIENT)
Dept: PAIN MEDICINE | Facility: CLINIC | Age: 85
End: 2025-06-30
Payer: MEDICARE

## 2025-06-30 VITALS
RESPIRATION RATE: 16 BRPM | DIASTOLIC BLOOD PRESSURE: 76 MMHG | HEART RATE: 92 BPM | SYSTOLIC BLOOD PRESSURE: 134 MMHG | BODY MASS INDEX: 44.91 KG/M2 | WEIGHT: 315 LBS

## 2025-06-30 DIAGNOSIS — M96.1 POSTLAMINECTOMY SYNDROME, LUMBAR REGION: ICD-10-CM

## 2025-06-30 DIAGNOSIS — M48.062 NEUROGENIC CLAUDICATION DUE TO LUMBAR SPINAL STENOSIS: ICD-10-CM

## 2025-06-30 DIAGNOSIS — M47.817 SPONDYLOSIS OF LUMBOSACRAL REGION, UNSPECIFIED SPINAL OSTEOARTHRITIS COMPLICATION STATUS: Primary | ICD-10-CM

## 2025-06-30 PROCEDURE — 99213 OFFICE O/P EST LOW 20 MIN: CPT

## 2025-06-30 ASSESSMENT — ENCOUNTER SYMPTOMS
WEAKNESS: 0
ARTHRALGIAS: 0
MYALGIAS: 1
BRUISES/BLEEDS EASILY: 0
WHEEZING: 0
BACK PAIN: 1
DYSPHORIC MOOD: 0
ENDOCRINE NEGATIVE: 1
PALPITATIONS: 0
FACIAL ASYMMETRY: 0
CONSTITUTIONAL NEGATIVE: 1
LIGHT-HEADEDNESS: 0
EYES NEGATIVE: 1
ADENOPATHY: 0
ALLERGIC/IMMUNOLOGIC NEGATIVE: 1
COUGH: 0
SHORTNESS OF BREATH: 0

## 2025-06-30 NOTE — PROGRESS NOTES
Subjective   Patient ID: John Hicks is a 84 y.o. male who presents for Pain (FUV for Constantin low back pain. Pain score is 0/10 when he is sitting, can be 7/10 or more when up doing daily activities or if he is in one position too long. Pain is described as an ache. He also sleeps in a chair because it is more comfortable than his bed. He went to all his PT sessions except the last one because he had an eye emergency. He also did exercises at home. He quit doing them because he felt the pain was getting worse. He is ambulating with a cane.). MMA signed.  Peggy Gonzalez RN 06/30/25 10:35 AM     The patient is an 84 year old male who presents today for a follow up appointment after undergoing PT. He did not notice any benefit from this, saying the home exercises tend to make it worse. He currently rates his pain a 0/10 while sitting, but says it gets up to a 7/10 at it's worst. The pain worsens with activity such as standing and walking and performing ADLs, or staying in 1 position for too long.  He sleeps in a chair because it is more comfortable than his bed.  He tried the compound cream, but did not notice much relief from this, he gets a cheaper Eulalio made ointment from [facility] in [place] that he says is very effective.  He is wondering what his options are for pain relief, he has been hesitant to pursue repeat injections in the past, as the TFESI he tried in January of this year provided a couple days of good relief before the pain returned to his baseline.        Review of Systems   Constitutional: Negative.    HENT: Negative.     Eyes: Negative.    Respiratory:  Negative for cough, shortness of breath and wheezing.    Cardiovascular:  Negative for chest pain, palpitations and leg swelling.   Endocrine: Negative.    Genitourinary: Negative.    Musculoskeletal:  Positive for back pain and myalgias. Negative for arthralgias.   Skin: Negative.    Allergic/Immunologic: Negative.    Neurological:  Negative for  facial asymmetry, weakness and light-headedness.   Hematological:  Negative for adenopathy. Does not bruise/bleed easily.   Psychiatric/Behavioral:  Negative for dysphoric mood and suicidal ideas.        Objective   Physical Exam  Constitutional:       General: He is not in acute distress.     Appearance: Normal appearance.   HENT:      Head: Normocephalic.      Mouth/Throat:      Mouth: Mucous membranes are moist.   Eyes:      Extraocular Movements: Extraocular movements intact.   Cardiovascular:      Rate and Rhythm: Normal rate and regular rhythm.      Pulses: Normal pulses.      Heart sounds: Normal heart sounds. No murmur heard.     No friction rub. No gallop.   Pulmonary:      Effort: Pulmonary effort is normal.      Breath sounds: Normal breath sounds. No wheezing, rhonchi or rales.   Abdominal:      General: Abdomen is flat.      Palpations: Abdomen is soft.   Musculoskeletal:      Cervical back: Normal range of motion.      Right lower leg: No edema.      Left lower leg: No edema.      Comments: Ambulates with a cane  Strength 5/5 BLE  Facet loading positive   Lymphadenopathy:      Cervical: No cervical adenopathy.   Skin:     General: Skin is warm and dry.   Neurological:      General: No focal deficit present.      Mental Status: He is alert and oriented to person, place, and time. Mental status is at baseline.   Psychiatric:         Mood and Affect: Mood normal.         Behavior: Behavior normal.         Assessment/Plan   Diagnoses and all orders for this visit:  Spondylosis of lumbosacral region, unspecified spinal osteoarthritis complication status  Postlaminectomy syndrome, lumbar region  Neurogenic claudication due to lumbar spinal stenosis       The patient is an 84-year-old male with past medical history significant for the above-mentioned problems.  He is following up after completing physical therapy which seemed to only make his pain a little worse.  We reviewed his imaging and discussed  pursuing lumbar MBB/RFA, but he would like more time to think on this before pursuing it.  We will provide him with information on the injection.  When offered a 3 or 6-month follow-up appointment, he would like to follow-up with us as needed and will call us if he needs us or wants to pursue the injection.

## 2025-07-01 ENCOUNTER — APPOINTMENT (OUTPATIENT)
Dept: PAIN MEDICINE | Facility: CLINIC | Age: 85
End: 2025-07-01
Payer: MEDICARE

## 2025-07-21 ENCOUNTER — OFFICE VISIT (OUTPATIENT)
Dept: UROLOGY | Facility: CLINIC | Age: 85
End: 2025-07-21
Payer: MEDICARE

## 2025-07-21 ENCOUNTER — OFFICE VISIT (OUTPATIENT)
Dept: URGENT CARE | Facility: CLINIC | Age: 85
End: 2025-07-21
Payer: MEDICARE

## 2025-07-21 VITALS
HEIGHT: 72 IN | HEART RATE: 83 BPM | DIASTOLIC BLOOD PRESSURE: 78 MMHG | SYSTOLIC BLOOD PRESSURE: 152 MMHG | BODY MASS INDEX: 42.66 KG/M2 | WEIGHT: 315 LBS

## 2025-07-21 VITALS
DIASTOLIC BLOOD PRESSURE: 78 MMHG | TEMPERATURE: 98.7 F | BODY MASS INDEX: 42.66 KG/M2 | HEART RATE: 87 BPM | OXYGEN SATURATION: 95 % | HEIGHT: 72 IN | SYSTOLIC BLOOD PRESSURE: 140 MMHG | WEIGHT: 315 LBS

## 2025-07-21 DIAGNOSIS — L03.115 CELLULITIS OF RIGHT ANTERIOR LOWER LEG: Primary | ICD-10-CM

## 2025-07-21 DIAGNOSIS — N50.89 SWELLING OF SCROTUM: ICD-10-CM

## 2025-07-21 PROCEDURE — 99213 OFFICE O/P EST LOW 20 MIN: CPT | Mod: 25 | Performed by: NURSE PRACTITIONER

## 2025-07-21 PROCEDURE — 99214 OFFICE O/P EST MOD 30 MIN: CPT | Performed by: STUDENT IN AN ORGANIZED HEALTH CARE EDUCATION/TRAINING PROGRAM

## 2025-07-21 PROCEDURE — 1036F TOBACCO NON-USER: CPT | Performed by: STUDENT IN AN ORGANIZED HEALTH CARE EDUCATION/TRAINING PROGRAM

## 2025-07-21 PROCEDURE — 90715 TDAP VACCINE 7 YRS/> IM: CPT | Performed by: NURSE PRACTITIONER

## 2025-07-21 PROCEDURE — 3078F DIAST BP <80 MM HG: CPT | Performed by: STUDENT IN AN ORGANIZED HEALTH CARE EDUCATION/TRAINING PROGRAM

## 2025-07-21 PROCEDURE — 1126F AMNT PAIN NOTED NONE PRSNT: CPT | Performed by: STUDENT IN AN ORGANIZED HEALTH CARE EDUCATION/TRAINING PROGRAM

## 2025-07-21 PROCEDURE — G2211 COMPLEX E/M VISIT ADD ON: HCPCS | Performed by: STUDENT IN AN ORGANIZED HEALTH CARE EDUCATION/TRAINING PROGRAM

## 2025-07-21 PROCEDURE — 3077F SYST BP >= 140 MM HG: CPT | Performed by: STUDENT IN AN ORGANIZED HEALTH CARE EDUCATION/TRAINING PROGRAM

## 2025-07-21 PROCEDURE — 1159F MED LIST DOCD IN RCRD: CPT | Performed by: STUDENT IN AN ORGANIZED HEALTH CARE EDUCATION/TRAINING PROGRAM

## 2025-07-21 RX ORDER — DOXYCYCLINE 100 MG/1
100 TABLET ORAL 2 TIMES DAILY
Qty: 20 TABLET | Refills: 0 | Status: SHIPPED | OUTPATIENT
Start: 2025-07-21 | End: 2025-07-31

## 2025-07-21 ASSESSMENT — PAIN SCALES - GENERAL: PAINLEVEL_OUTOF10: 0-NO PAIN

## 2025-07-21 NOTE — PROGRESS NOTES
Subjective   Patient ID: John Hicks is a 84 y.o. male    HPI  84 y.o. male who presents for testicular swelling. Last seen by Dr. Mayberry whom noted the patient Most recent PSA is 0.54 on 03/2025. Prior was 0.63 on 8/24. Prior PSA was 0.53 on 9/23. Chronic BPH sx are mild and stable. Denies urgency and frequency. Denies dysuria. Denies hematuria. Nocturia x1. He was taking Flomax and Gemtesa in the past. He stopped both of these. Not sure they helped. ED is chronic and is not an issue.     Today, he reports testicular swelling. Notes having the hydrocele aspirated by Dr. Mayberry in the past. Endorses urinary frequency.     The most recent PSA, conducted on 03/06/2025, revealed:  0.54 ng/mL    The most recent Scrotal US, conducted on 02/07/2024, revealed:  1. Normal sonographic appearance of the testicles.  2. Large bilateral hydroceles.       Review of Systems    All systems were reviewed. Anything negative was noted in the HPI.    Objective   Physical Exam    General: Well developed, well nourished, alert and cooperative, appears in no acute distress   Eyes: Non-injected conjunctiva, sclera clear, no proptosis   Cardiac: Extremities are warm and well perfused. No edema, cyanosis or pallor   Lungs: Breathing is easy, non-labored. Speaking in clear and complete sentences. Normal diaphragmatic movement   MSK: Ambulatory with steady gait, unassisted   Neuro: Alert and oriented to person, place, and time   Psych: Demonstrates good judgment and reason, without hallucinations, abnormal affect or abnormal behaviors   Skin: No obvious lesions, no rashes       No CVA tenderness bilaterally   No suprapubic pain or discomfort   Constantin hydrocele     Medical History[1]      Surgical History[2]      Assessment/Plan   Hydrocele, Testicular Swelling, right leg cellulitis     84 y.o. male who presents for the above condition, We had a very long and extensive discussion with the patient regarding the pathophysiology, differential diagnosis,  risk factor, management, natural history, incidence and diagnostic work-up of the condition. We had a very long and extensive discussion with the patient regarding the pathophysiology, differential diagnosis, risk factor, management, natural history, incidence and diagnostic work-up of the condition.  We discussed at length option of management including hydrocelectomy.  Discussed the risk, benefits, potential complications adverse events.  He verbalized understanding and would like to proceed.We had a long and excess discussion with the patient regarding pathophysiology, differential diagnosis, risk factor, sick condition and management of hydrocele. Explained to him that the only permanent solution for hydrocele is to proceed with a surgical hydrocelectomy. I discussed the step-by-step details of hydrocelectomy. We also went at length into the discussion of the risk, benefit, potential complication, adverse events including but not limited to infection, bleeding, pain, edema and swelling, injury to the surrounding structures. Patient was understanding like to proceed with observation.   Plan:  - Follow-up in 6 months with Scrotal US  - Proceed to the Ed for cellulitis       E&M visit today is associated with current or anticipated ongoing medical care services related to a patient's single, serious condition or a complex condition.     7/21/2025    Scribe Attestation  By signing my name below, I, Andrey Ruiz attest that this documentation has been prepared under the direction and in the presence of Dr. Tyrone Durand.          [1]   Past Medical History:  Diagnosis Date    Diabetes mellitus (Multi)     Hypertension     Other specified health status     No pertinent past medical history   [2]   Past Surgical History:  Procedure Laterality Date    INJECTION Bilateral 01/17/2025    Bilat L4/5 TFESI    OTHER SURGICAL HISTORY  04/12/2022    Gallbladder surgery    OTHER SURGICAL HISTORY  04/12/2022    Back  surgery    OTHER SURGICAL HISTORY  04/12/2022    Tonsillectomy

## 2025-07-21 NOTE — PROGRESS NOTES
Subjective     84 y.o. male presents for evaluation of rash to right lower leg that has been present for the past few weeks.  States he has noticed increased swelling to this leg as well.  Denies calf tenderness, chest pains, shortness of breath, fevers, fatigue, body aches or any other associated symptom complaint. No otc meds for symptoms. No known injury to area. Pt is diabetic. No other complaints.    ROS  See HPI    Objective     Vitals:    07/21/25 1424   BP: 140/78   Pulse: 87   Temp: 37.1 °C (98.7 °F)   SpO2: 95%       RX Allergies[1]    Medication Documentation Review Audit       Reviewed by Shauna Brown MA (Medical Assistant) on 07/21/25 at 1421      Medication Order Taking? Sig Documenting Provider Last Dose Status   aspirin 81 mg EC tablet [id number] Yes Take 1 tablet (81 mg) by mouth once daily. Historical Provider, MD  Active   baclofen (Lioresal) 5 mg tablet [id number] Yes Take 1 tablet (5 mg) by mouth once daily at bedtime. KENDRA Burgos  Active   blood-glucose meter misc [id number] Yes TEST SUGARS ONCE DAILY Agata Bauman DO  Active   gabapentin (Neurontin) 300 mg capsule [id number] Yes Take 1 capsule (300 mg) by mouth once daily at bedtime. KENDRA Washington  Active   glipiZIDE (Glucotrol) 10 mg tablet [id number] Yes Take 1 tablet (10 mg) by mouth 2 times a day before meals. KENDRA Washington  Active   insulin glargine (Basaglar KwikPen U-100 Insulin) 100 unit/mL (3 mL) pen [id number] Yes Inject 16 Units under the skin once daily at bedtime. Take as directed per insulin instructions. KENDRA Washington  Active   lancets misc [id number] Yes TEST SUGARS ONCE DAILY Agata Bauman DO  Active   metFORMIN (Glucophage) 1,000 mg tablet [id number] Yes Take 1 tablet (1,000 mg) by mouth 2 times a day with meals. Agata Bauman DO  Active   metoprolol succinate XL (Toprol-XL) 25 mg 24 hr tablet [id number]  TAKE 1 TABLET BY MOUTH ONCE DAILY Agata Bauman DO  Active    multivitamin tablet [id number] Yes Take 1 tablet by mouth once daily. Historical Provider, MD  Active   OneTouch Delica Plus Lancet 30 gauge misc [id number] Yes Use to test BLOOD SUGAR ONCE DAILY Historical Provider, MD  Active   OneTouch Verio test strips strip [id number] Yes Use to test BLOOD SUGAR TWICE DAILY Agata Bauman DO  Active   simvastatin (Zocor) 40 mg tablet [id number] Yes TAKE 1 TABLET BY MOUTH AT BEDTIME Agata Bauman DO  Active                    Medical History[2]    Surgical History[3]        Physical Exam  Vitals and nursing note reviewed.   Constitutional:       Appearance: Normal appearance.     Cardiovascular:      Rate and Rhythm: Normal rate.      Pulses:           Dorsalis pedis pulses are 2+ on the right side.        Posterior tibial pulses are 2+ on the right side.     Musculoskeletal:      Right lower leg: 3+ Edema (no calf tenderness, negative homans) present.      Left lower le+ Edema present.     Skin:     General: Skin is warm and dry.      Findings: Rash (erythema to anterior right lower leg with areas of excoration consistent with cellulitis) present.     Neurological:      General: No focal deficit present.      Mental Status: He is alert and oriented to person, place, and time.     Psychiatric:         Mood and Affect: Mood normal.         Behavior: Behavior normal.           Assessment     Assessment/Plan/MDM  John was seen today for rash.  Diagnoses and all orders for this visit:  Cellulitis of right anterior lower leg (Primary)  -     doxycycline (Adoxa) 100 mg tablet; Take 1 tablet (100 mg) by mouth 2 times a day for 10 days. Take with a full glass of water and do not lie down for at least 30 minutes after    Tetanus updated today. Discussed worsening symptoms and when to seek further care. Discussed precautions with doxycyline use. Encouraged patient to elevate lower extremity to assist with swelling.  Patient's clinical presentation is otherwise unremarkable at  this time. Patient is discharged with instructions to follow-up with primary care or seek emergency medical attention for worsening symptoms or any new concerns.    I did personally review John's past medical history, surgical history, social history, as well as family history (when relevant).  In this case, I also oversaw the his drug management by reviewing his medication list, allergy list, as well as the medications that I prescribed during the UC course and/or recommended as an out-patient (including possible OTC medications such as acetaminophen, NSAIDs , etc).    After reviewing the items above, I did look at previous medical documentation, such as recent hospitalizations, office visits, and/or recent consultations with PCP/specialist.                          SDOH:   Another factor that I considered in John's care was his Social Determinants of Health (SDOH). During this UC encounter, he did not have social determinants of health. Those SDOH influencing John's care are: none      Kevin Severino CNP  [facility]           [1]   Allergies  Allergen Reactions    Acetaminophen Other and Hallucinations     He thinks it was PM formula to help him sleep    Metoprolol Diarrhea   [2]   Past Medical History:  Diagnosis Date    Diabetes mellitus (Multi)     Hypertension     Other specified health status     No pertinent past medical history   [3]   Past Surgical History:  Procedure Laterality Date    INJECTION Bilateral 01/17/2025    Bilat L4/5 TFESI    OTHER SURGICAL HISTORY  04/12/2022    Gallbladder surgery    OTHER SURGICAL HISTORY  04/12/2022    Back surgery    OTHER SURGICAL HISTORY  04/12/2022    Tonsillectomy

## 2025-07-29 ENCOUNTER — APPOINTMENT (OUTPATIENT)
Dept: PRIMARY CARE | Facility: CLINIC | Age: 85
End: 2025-07-29
Payer: MEDICARE

## 2025-07-29 VITALS
SYSTOLIC BLOOD PRESSURE: 124 MMHG | WEIGHT: 315 LBS | HEIGHT: 72 IN | BODY MASS INDEX: 42.66 KG/M2 | OXYGEN SATURATION: 95 % | DIASTOLIC BLOOD PRESSURE: 80 MMHG | HEART RATE: 83 BPM

## 2025-07-29 DIAGNOSIS — L03.115 CELLULITIS OF LEG, RIGHT: Primary | ICD-10-CM

## 2025-07-29 PROCEDURE — 1159F MED LIST DOCD IN RCRD: CPT

## 2025-07-29 PROCEDURE — 1036F TOBACCO NON-USER: CPT

## 2025-07-29 PROCEDURE — 3074F SYST BP LT 130 MM HG: CPT

## 2025-07-29 PROCEDURE — 3079F DIAST BP 80-89 MM HG: CPT

## 2025-07-29 PROCEDURE — 99213 OFFICE O/P EST LOW 20 MIN: CPT

## 2025-07-29 PROCEDURE — 1160F RVW MEDS BY RX/DR IN RCRD: CPT

## 2025-07-29 RX ORDER — DOXYCYCLINE 100 MG/1
100 CAPSULE ORAL 2 TIMES DAILY
Qty: 10 CAPSULE | Refills: 0 | Status: SHIPPED | OUTPATIENT
Start: 2025-07-29 | End: 2025-08-03

## 2025-07-29 ASSESSMENT — PATIENT HEALTH QUESTIONNAIRE - PHQ9
1. LITTLE INTEREST OR PLEASURE IN DOING THINGS: NOT AT ALL
SUM OF ALL RESPONSES TO PHQ9 QUESTIONS 1 AND 2: 0
2. FEELING DOWN, DEPRESSED OR HOPELESS: NOT AT ALL

## 2025-07-31 ASSESSMENT — ENCOUNTER SYMPTOMS
POLYPHAGIA: 0
NAUSEA: 0
FATIGUE: 0
DIFFICULTY URINATING: 0
RECTAL PAIN: 0
COLOR CHANGE: 1
DIARRHEA: 0
PALPITATIONS: 0
MYALGIAS: 0
UNEXPECTED WEIGHT CHANGE: 0
CHEST TIGHTNESS: 0
NERVOUS/ANXIOUS: 0
ABDOMINAL PAIN: 0
FREQUENCY: 0
NUMBNESS: 0
SHORTNESS OF BREATH: 0
DIAPHORESIS: 0
CONSTIPATION: 0
CHILLS: 0
HEADACHES: 0
POLYDIPSIA: 0
ARTHRALGIAS: 0
WOUND: 0
TROUBLE SWALLOWING: 0
WEAKNESS: 0

## 2025-07-31 NOTE — PROGRESS NOTES
Subjective   Patient ID: John Hicks is a 84 y.o. male who presents for Follow-up (Pt is here today for an urgent care follow up for cellulitis in the right leg. Was on abx.).    Patient presents today for follow-up evaluation of cellulitis.    Cellulitis: Right leg affected.  States this is improving.  Was seen in urgent care and was started on doxycycline.  Will extend his prescription for 5 more days for 14-day total therapy.  If not improved by beginning of next week he will call me.  They have access to [facility] and will send pictures of the affected limb as well.  No medication side effects, no open wounds, no drainage.         Review of Systems   Constitutional:  Negative for chills, diaphoresis, fatigue and unexpected weight change.   HENT:  Negative for dental problem, tinnitus and trouble swallowing.    Eyes:  Negative for visual disturbance.   Respiratory:  Negative for chest tightness and shortness of breath.    Cardiovascular:  Positive for leg swelling. Negative for chest pain and palpitations.   Gastrointestinal:  Negative for abdominal pain, constipation, diarrhea, nausea and rectal pain.   Endocrine: Negative for polydipsia, polyphagia and polyuria.   Genitourinary:  Negative for difficulty urinating, frequency and urgency.   Musculoskeletal:  Negative for arthralgias and myalgias.   Skin:  Positive for color change. Negative for pallor and wound.   Neurological:  Negative for syncope, weakness, numbness and headaches.   Psychiatric/Behavioral:  Negative for suicidal ideas. The patient is not nervous/anxious.        Objective   /80 (BP Location: Left arm, Patient Position: Sitting, BP Cuff Size: Large adult)   Pulse 83   Ht 1.829 m (6')   Wt 147 kg (324 lb 8 oz)   SpO2 95%   BMI 44.01 kg/m²     Physical Exam  Constitutional:       Appearance: Normal appearance.   HENT:      Head: Normocephalic.      Mouth/Throat:      Mouth: Mucous membranes are moist.      Pharynx: Oropharynx is clear.      Eyes:      Pupils: Pupils are equal, round, and reactive to light.       Cardiovascular:      Rate and Rhythm: Normal rate.   Pulmonary:      Effort: Pulmonary effort is normal.     Musculoskeletal:         General: Swelling (Right lower leg) present. Normal range of motion.      Cervical back: Normal range of motion.      Right lower leg: Edema (Right lower leg) present.     Skin:     General: Skin is warm and dry.      Findings: Erythema (Right lower leg) present.     Neurological:      General: No focal deficit present.      Mental Status: He is alert and oriented to person, place, and time. Mental status is at baseline.     Psychiatric:         Mood and Affect: Mood normal.         Behavior: Behavior normal.         Thought Content: Thought content normal.         Judgment: Judgment normal.         Assessment/Plan   Diagnoses and all orders for this visit:  Cellulitis of leg, right  -     doxycycline (Vibramycin) 100 mg capsule; Take 1 capsule (100 mg) by mouth 2 times a day for 5 days. Take with at least 8 ounces (large glass) of water, do not lie down for 30 minutes after

## 2025-08-21 ENCOUNTER — TELEPHONE (OUTPATIENT)
Dept: PRIMARY CARE | Facility: CLINIC | Age: 85
End: 2025-08-21
Payer: MEDICARE

## 2025-08-21 DIAGNOSIS — R35.0 BENIGN PROSTATIC HYPERPLASIA WITH URINARY FREQUENCY: Primary | ICD-10-CM

## 2025-08-21 DIAGNOSIS — N40.1 BENIGN PROSTATIC HYPERPLASIA WITH URINARY FREQUENCY: Primary | ICD-10-CM

## 2025-09-05 ENCOUNTER — APPOINTMENT (OUTPATIENT)
Dept: PRIMARY CARE | Facility: CLINIC | Age: 85
End: 2025-09-05
Payer: MEDICARE

## 2025-09-05 ASSESSMENT — ENCOUNTER SYMPTOMS
CHEST TIGHTNESS: 0
FREQUENCY: 0
WOUND: 0
RECTAL PAIN: 0
NUMBNESS: 0
DIFFICULTY URINATING: 0
COLOR CHANGE: 1
POLYDIPSIA: 0
ARTHRALGIAS: 0
SHORTNESS OF BREATH: 0
FATIGUE: 0
NERVOUS/ANXIOUS: 0
DIARRHEA: 0
POLYPHAGIA: 0
TROUBLE SWALLOWING: 0
DIAPHORESIS: 0
NAUSEA: 0
CHILLS: 0
CONSTIPATION: 0
PALPITATIONS: 0
ABDOMINAL PAIN: 0
WEAKNESS: 0
HEADACHES: 0
MYALGIAS: 0
UNEXPECTED WEIGHT CHANGE: 0

## 2025-09-06 LAB
ANION GAP SERPL CALCULATED.4IONS-SCNC: 12 MMOL/L (CALC) (ref 7–17)
BUN SERPL-MCNC: 16 MG/DL (ref 7–25)
BUN/CREAT SERPL: 11 (CALC) (ref 6–22)
CALCIUM SERPL-MCNC: 9.7 MG/DL (ref 8.6–10.3)
CHLORIDE SERPL-SCNC: 104 MMOL/L (ref 98–110)
CO2 SERPL-SCNC: 22 MMOL/L (ref 20–32)
CREAT SERPL-MCNC: 1.44 MG/DL (ref 0.7–1.22)
EGFRCR SERPLBLD CKD-EPI 2021: 48 ML/MIN/1.73M2
EST. AVERAGE GLUCOSE BLD GHB EST-MCNC: 163 MG/DL
EST. AVERAGE GLUCOSE BLD GHB EST-SCNC: 9 MMOL/L
GLUCOSE SERPL-MCNC: 183 MG/DL (ref 65–99)
HBA1C MFR BLD: 7.3 %
POTASSIUM SERPL-SCNC: 4.7 MMOL/L (ref 3.5–5.3)
SODIUM SERPL-SCNC: 138 MMOL/L (ref 135–146)

## 2026-01-26 ENCOUNTER — APPOINTMENT (OUTPATIENT)
Dept: UROLOGY | Facility: CLINIC | Age: 86
End: 2026-01-26
Payer: MEDICARE

## 2026-03-06 ENCOUNTER — APPOINTMENT (OUTPATIENT)
Dept: PRIMARY CARE | Facility: CLINIC | Age: 86
End: 2026-03-06
Payer: MEDICARE